# Patient Record
Sex: MALE | Race: OTHER | HISPANIC OR LATINO
[De-identification: names, ages, dates, MRNs, and addresses within clinical notes are randomized per-mention and may not be internally consistent; named-entity substitution may affect disease eponyms.]

---

## 2019-03-29 ENCOUNTER — INBOUND DOCUMENT (OUTPATIENT)
Age: 44
End: 2019-03-29

## 2019-03-29 PROBLEM — Z00.00 ENCOUNTER FOR PREVENTIVE HEALTH EXAMINATION: Status: ACTIVE | Noted: 2019-03-29

## 2019-04-04 ENCOUNTER — APPOINTMENT (OUTPATIENT)
Dept: PULMONOLOGY | Facility: CLINIC | Age: 44
End: 2019-04-04
Payer: COMMERCIAL

## 2019-04-04 VITALS
WEIGHT: 185 LBS | DIASTOLIC BLOOD PRESSURE: 80 MMHG | HEART RATE: 90 BPM | BODY MASS INDEX: 30.82 KG/M2 | OXYGEN SATURATION: 95 % | SYSTOLIC BLOOD PRESSURE: 140 MMHG | HEIGHT: 65 IN

## 2019-04-04 DIAGNOSIS — Z87.891 PERSONAL HISTORY OF NICOTINE DEPENDENCE: ICD-10-CM

## 2019-04-04 DIAGNOSIS — D23.9 OTHER BENIGN NEOPLASM OF SKIN, UNSPECIFIED: ICD-10-CM

## 2019-04-04 PROCEDURE — 99495 TRANSJ CARE MGMT MOD F2F 14D: CPT

## 2019-04-04 PROCEDURE — 99214 OFFICE O/P EST MOD 30 MIN: CPT

## 2019-04-04 NOTE — PHYSICAL EXAM
[General Appearance - Well Developed] : well developed [General Appearance - Well Nourished] : well nourished [Normal Conjunctiva] : the conjunctiva exhibited no abnormalities [Elongated Uvula] : elongated uvula [Neck Appearance] : the appearance of the neck was normal [Jugular Venous Distention Increased] : there was no jugular-venous distention [Thyroid Diffuse Enlargement] : the thyroid was not enlarged [Heart Sounds] : normal S1 and S2 [Arterial Pulses Normal] : the arterial pulses were normal [Edema] : no peripheral edema present [Respiration, Rhythm And Depth] : normal respiratory rhythm and effort [Auscultation Breath Sounds / Voice Sounds] : lungs were clear to auscultation bilaterally [Lungs Percussion] : the lungs were normal to percussion [Bowel Sounds] : normal bowel sounds [Abdomen Soft] : soft [Abdomen Tenderness] : non-tender [Abnormal Walk] : normal gait [Skin Turgor] : normal skin turgor [] : no rash [No Focal Deficits] : no focal deficits [Oriented To Time, Place, And Person] : oriented to person, place, and time [Impaired Insight] : insight and judgment were intact [Affect] : the affect was normal [Memory Recent] : recent memory was not impaired

## 2019-04-04 NOTE — CONSULT LETTER
[Dear  ___] : Dear  [unfilled], [Consult Letter:] : I had the pleasure of evaluating your patient, [unfilled]. [Please see my note below.] : Please see my note below. [Consult Closing:] : Thank you very much for allowing me to participate in the care of this patient.  If you have any questions, please do not hesitate to contact me. [Sincerely,] : Sincerely, [DrSelvin  ___] : Dr. COURTNEY

## 2019-05-02 NOTE — HISTORY OF PRESENT ILLNESS
[FreeTextEntry1] : 42 yo male with left hilar mass occluding pulmonary artery cut off and left effusion.\par Mass not noted but effusion present in 2008\par No Dx on Bronch and ebus.  Pleural Bx and VATS unrevealing\par TB and CVD (KAYLIN, ANCA) LUIS neg. 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

## 2019-05-02 NOTE — ASSESSMENT
[FreeTextEntry1] : Possible congenital mass\par Lymphoma not fully excluded \par Infection seems unlikely

## 2019-05-03 ENCOUNTER — APPOINTMENT (OUTPATIENT)
Dept: PULMONOLOGY | Facility: CLINIC | Age: 44
End: 2019-05-03
Payer: COMMERCIAL

## 2019-05-03 VITALS
WEIGHT: 192 LBS | SYSTOLIC BLOOD PRESSURE: 122 MMHG | DIASTOLIC BLOOD PRESSURE: 80 MMHG | BODY MASS INDEX: 31.95 KG/M2 | OXYGEN SATURATION: 99 % | HEART RATE: 87 BPM

## 2019-05-03 PROCEDURE — 99214 OFFICE O/P EST MOD 30 MIN: CPT

## 2019-05-03 NOTE — PHYSICAL EXAM
[General Appearance - Well Developed] : well developed [General Appearance - Well Nourished] : well nourished [Normal Conjunctiva] : the conjunctiva exhibited no abnormalities [Elongated Uvula] : elongated uvula [Neck Appearance] : the appearance of the neck was normal [Jugular Venous Distention Increased] : there was no jugular-venous distention [Thyroid Diffuse Enlargement] : the thyroid was not enlarged [Heart Sounds] : normal S1 and S2 [Arterial Pulses Normal] : the arterial pulses were normal [Edema] : no peripheral edema present [Respiration, Rhythm And Depth] : normal respiratory rhythm and effort [Lungs Percussion] : the lungs were normal to percussion [Auscultation Breath Sounds / Voice Sounds] : lungs were clear to auscultation bilaterally [Bowel Sounds] : normal bowel sounds [Abdomen Soft] : soft [Abdomen Tenderness] : non-tender [Abnormal Walk] : normal gait [No Focal Deficits] : no focal deficits [Oriented To Time, Place, And Person] : oriented to person, place, and time [Impaired Insight] : insight and judgment were intact [Memory Recent] : recent memory was not impaired [Affect] : the affect was normal [Skin Turgor] : normal skin turgor [] : no rash

## 2019-05-07 NOTE — HISTORY OF PRESENT ILLNESS
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  Pleural Bx and VATS unrevealing\par TB and CVD (KAYLIN, ANCA) LUIS neg. 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 radiology\par They believe it represent chronic thromboembolic disease with evidence of pulmonary hypertension

## 2019-05-31 ENCOUNTER — APPOINTMENT (OUTPATIENT)
Dept: PULMONOLOGY | Facility: CLINIC | Age: 44
End: 2019-05-31
Payer: COMMERCIAL

## 2019-05-31 VITALS — WEIGHT: 200 LBS | BODY MASS INDEX: 33.28 KG/M2

## 2019-05-31 VITALS
HEIGHT: 65 IN | DIASTOLIC BLOOD PRESSURE: 80 MMHG | HEART RATE: 84 BPM | SYSTOLIC BLOOD PRESSURE: 130 MMHG | OXYGEN SATURATION: 96 % | BODY MASS INDEX: 33.28 KG/M2

## 2019-05-31 PROCEDURE — 99214 OFFICE O/P EST MOD 30 MIN: CPT

## 2019-05-31 NOTE — DISCUSSION/SUMMARY
[FreeTextEntry1] : Chronic Thromboembolic Pulmonary Hypertension No active clot Await Cardio and Echo No clear need for Rx Patient informed of signs and symptoms of VTE and told to call if they occur

## 2019-05-31 NOTE — HISTORY OF PRESENT ILLNESS
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  Pleural Bx and VATS unrevealing\par TB and CVD (KAYLIN, ANCA) LUIS neg. 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

## 2019-07-11 ENCOUNTER — APPOINTMENT (OUTPATIENT)
Dept: CARDIOLOGY | Facility: CLINIC | Age: 44
End: 2019-07-11
Payer: COMMERCIAL

## 2019-07-11 ENCOUNTER — NON-APPOINTMENT (OUTPATIENT)
Age: 44
End: 2019-07-11

## 2019-07-11 VITALS
HEART RATE: 75 BPM | OXYGEN SATURATION: 97 % | WEIGHT: 201 LBS | SYSTOLIC BLOOD PRESSURE: 136 MMHG | HEIGHT: 65 IN | DIASTOLIC BLOOD PRESSURE: 73 MMHG | BODY MASS INDEX: 33.49 KG/M2

## 2019-07-11 VITALS — SYSTOLIC BLOOD PRESSURE: 129 MMHG | DIASTOLIC BLOOD PRESSURE: 77 MMHG

## 2019-07-11 DIAGNOSIS — Z78.9 OTHER SPECIFIED HEALTH STATUS: ICD-10-CM

## 2019-07-11 DIAGNOSIS — Z71.9 COUNSELING, UNSPECIFIED: ICD-10-CM

## 2019-07-11 DIAGNOSIS — J18.9 PNEUMONIA, UNSPECIFIED ORGANISM: ICD-10-CM

## 2019-07-11 PROCEDURE — 99204 OFFICE O/P NEW MOD 45 MIN: CPT | Mod: 25

## 2019-07-11 PROCEDURE — 93000 ELECTROCARDIOGRAM COMPLETE: CPT

## 2019-07-22 ENCOUNTER — APPOINTMENT (OUTPATIENT)
Dept: CARDIOLOGY | Facility: CLINIC | Age: 44
End: 2019-07-22

## 2019-08-05 ENCOUNTER — APPOINTMENT (OUTPATIENT)
Dept: CARDIOLOGY | Facility: CLINIC | Age: 44
End: 2019-08-05
Payer: COMMERCIAL

## 2019-08-05 VITALS
BODY MASS INDEX: 33.15 KG/M2 | DIASTOLIC BLOOD PRESSURE: 76 MMHG | HEART RATE: 76 BPM | SYSTOLIC BLOOD PRESSURE: 138 MMHG | HEIGHT: 65 IN | WEIGHT: 199 LBS | OXYGEN SATURATION: 95 %

## 2019-08-05 PROCEDURE — 93306 TTE W/DOPPLER COMPLETE: CPT

## 2019-08-05 PROCEDURE — 99214 OFFICE O/P EST MOD 30 MIN: CPT

## 2019-08-05 NOTE — HISTORY OF PRESENT ILLNESS
[FreeTextEntry1] : 42 yo male sent by Dr Guillaume Zaman with pulmonary hypertension. \par He is from [place] and denies any cardiac history including rheumatic fever. \par Pt with episode of PNA in 2008, with pleural effusion but no hilar mass. \par In March 2019 went to [facility], EBUS and VTAS Bx was reportedly negative for malignancy\par Repeated CT in April 2019 left hilar mass with narrowing of left upper lobe bronchus abd pulmonary htn, enlarged pulmonary artery. \par Pt feels well, no cp but has chronic cough and marsh. \par no palpitations, syncope or presyncope\par Denies any hx of DVT or PE. No family Hx for hypercoagulability.

## 2019-08-05 NOTE — PHYSICAL EXAM
[Normal Appearance] : normal appearance [Well Groomed] : well groomed [General Appearance - In No Acute Distress] : no acute distress [Normal Conjunctiva] : the conjunctiva exhibited no abnormalities [Normal Oral Mucosa] : normal oral mucosa [No Oral Pallor] : no oral pallor [Normal Oropharynx] : normal oropharynx [Normal Jugular Venous A Waves Present] : normal jugular venous A waves present [Normal Jugular Venous V Waves Present] : normal jugular venous V waves present [Heart Rate And Rhythm] : heart rate and rhythm were normal [Heart Sounds] : normal S1 and S2 [Arterial Pulses Normal] : the arterial pulses were normal [Respiration, Rhythm And Depth] : normal respiratory rhythm and effort [Auscultation Breath Sounds / Voice Sounds] : lungs were clear to auscultation bilaterally [Bowel Sounds] : normal bowel sounds [Abdomen Tenderness] : non-tender [Abdomen Soft] : soft [Abnormal Walk] : normal gait [Nail Clubbing] : no clubbing of the fingernails [Petechial Hemorrhages (___cm)] : no petechial hemorrhages [Skin Color & Pigmentation] : normal skin color and pigmentation [Skin Turgor] : normal skin turgor [Oriented To Time, Place, And Person] : oriented to person, place, and time [Impaired Insight] : insight and judgment were intact [Affect] : the affect was normal [FreeTextEntry1] : fixed split of second HS, 3/6 sm right and left sternal border,

## 2019-08-05 NOTE — REVIEW OF SYSTEMS
[Feeling Fatigued] : feeling fatigued [Sore Throat] : sore throat [Dyspnea on exertion] : dyspnea during exertion [Cough] : cough [Recent Weight Gain (___ Lbs)] : no recent weight gain [Blurry Vision] : no blurred vision [Recent Weight Loss (___ Lbs)] : no recent weight loss [Seeing Double (Diplopia)] : no diplopia [Eyeglasses] : not currently wearing eyeglasses [Mouth Sores] : no mouth sores [Earache] : no earache [Chest Pain] : no chest pain [Palpitations] : no palpitations [Wheezing] : no wheezing [Abdominal Pain] : no abdominal pain [Nausea] : no nausea [Change in Appetite] : no change in appetite [Heartburn] : no heartburn [Urinary Frequency] : no change in urinary frequency [Hematuria] : no hematuria [Impotence] : no impotence [Joint Pain] : no joint pain [Skin: A Rash] : no rash: [Muscle Cramps] : no muscle cramps [Skin Lesions] : no skin lesions [Dizziness] : no dizziness [Convulsions] : no convulsions [Confusion] : no confusion was observed [Anxiety] : no anxiety [Excessive Thirst] : no polydipsia [Easy Bruising] : no tendency for easy bruising [Easy Bleeding] : no tendency for easy bleeding

## 2019-08-05 NOTE — ASSESSMENT
[FreeTextEntry1] : Mr. Hodge is here with diagnosis of pulmonary hypertension. It is unclear if he has CTEPH or not. His VQ scan and CT scan have been negative. It is possible the he has pulmonary hypertension based on compression of PA from left sided mass. \par I asked him to repeat CT scan to exclude PE. He will do this at a  facility so we can review.\par TTE is also ordered. He will see me once the above are completed.

## 2019-08-06 ENCOUNTER — FORM ENCOUNTER (OUTPATIENT)
Age: 44
End: 2019-08-06

## 2019-08-07 ENCOUNTER — APPOINTMENT (OUTPATIENT)
Dept: CT IMAGING | Facility: CLINIC | Age: 44
End: 2019-08-07
Payer: COMMERCIAL

## 2019-08-07 ENCOUNTER — OUTPATIENT (OUTPATIENT)
Dept: OUTPATIENT SERVICES | Facility: HOSPITAL | Age: 44
LOS: 1 days | End: 2019-08-07
Payer: COMMERCIAL

## 2019-08-07 DIAGNOSIS — I27.24 CHRONIC THROMBOEMBOLIC PULMONARY HYPERTENSION: ICD-10-CM

## 2019-08-07 PROCEDURE — 82565 ASSAY OF CREATININE: CPT

## 2019-08-07 PROCEDURE — 71275 CT ANGIOGRAPHY CHEST: CPT | Mod: 26

## 2019-08-07 PROCEDURE — 71275 CT ANGIOGRAPHY CHEST: CPT

## 2019-08-19 ENCOUNTER — APPOINTMENT (OUTPATIENT)
Dept: CARDIOLOGY | Facility: CLINIC | Age: 44
End: 2019-08-19
Payer: COMMERCIAL

## 2019-08-19 ENCOUNTER — APPOINTMENT (OUTPATIENT)
Dept: THORACIC SURGERY | Facility: CLINIC | Age: 44
End: 2019-08-19
Payer: COMMERCIAL

## 2019-08-19 VITALS
OXYGEN SATURATION: 96 % | DIASTOLIC BLOOD PRESSURE: 89 MMHG | HEART RATE: 87 BPM | SYSTOLIC BLOOD PRESSURE: 145 MMHG | BODY MASS INDEX: 33.15 KG/M2 | RESPIRATION RATE: 16 BRPM | WEIGHT: 199 LBS | HEIGHT: 65 IN

## 2019-08-19 VITALS
HEART RATE: 92 BPM | BODY MASS INDEX: 34.16 KG/M2 | OXYGEN SATURATION: 97 % | SYSTOLIC BLOOD PRESSURE: 127 MMHG | DIASTOLIC BLOOD PRESSURE: 75 MMHG | HEIGHT: 65 IN | WEIGHT: 205 LBS

## 2019-08-19 DIAGNOSIS — I27.29 OTHER SECONDARY PULMONARY HYPERTENSION: ICD-10-CM

## 2019-08-19 PROCEDURE — 99214 OFFICE O/P EST MOD 30 MIN: CPT

## 2019-08-19 PROCEDURE — 99204 OFFICE O/P NEW MOD 45 MIN: CPT

## 2019-08-19 NOTE — PHYSICAL EXAM
[General Appearance - Alert] : alert [Neck Cervical Mass (___cm)] : no neck mass was observed [Neck Appearance] : the appearance of the neck was normal [General Appearance - In No Acute Distress] : in no acute distress [Jugular Venous Distention Increased] : there was no jugular-venous distention [Thyroid Diffuse Enlargement] : the thyroid was not enlarged [Thyroid Nodule] : there were no palpable thyroid nodules [Auscultation Breath Sounds / Voice Sounds] : lungs were clear to auscultation bilaterally [Heart Sounds] : normal S1 and S2 [Heart Rate And Rhythm] : heart rate was normal and rhythm regular [Murmurs] : no murmurs [Heart Sounds Gallop] : no gallops [Examination Of The Chest] : the chest was normal in appearance [Heart Sounds Pericardial Friction Rub] : no pericardial rub [Diminished Respiratory Excursion] : normal chest expansion [Chest Visual Inspection Thoracic Asymmetry] : no chest asymmetry [Abdomen Soft] : soft [Abdomen Tenderness] : non-tender [Bowel Sounds] : normal bowel sounds [Abdomen Mass (___ Cm)] : no abdominal mass palpated [] : no hepato-splenomegaly [Cervical Lymph Nodes Enlarged Anterior Bilaterally] : anterior cervical [Abnormal Walk] : normal gait [Cervical Lymph Nodes Enlarged Posterior Bilaterally] : posterior cervical [No Focal Deficits] : no focal deficits [Impaired Insight] : insight and judgment were intact [Oriented To Time, Place, And Person] : oriented to person, place, and time [Affect] : the affect was normal

## 2019-08-19 NOTE — HISTORY OF PRESENT ILLNESS
[FreeTextEntry1] : \larry Hodge was in the office today for an initial visit. He has a history of a mediastinal abnormality that has caused occlusion of his left pulmonary artery and what appears to be fibrotic changes in the mediastinum. 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 He did have an endobronchial ultrasound in addition to a thoracotomy at an outside institution with biopsies that did not demonstrate malignancy or a specific tissue diagnosis. He feels well at this point and has no complaints. He denies fever, chills, night sweats, weight loss or weight gain.

## 2019-08-19 NOTE — ASSESSMENT
[FreeTextEntry1] : Naveen is a 44-year-old male with what appears to be scarring in the mediastinum without evidence of malignancy seen on previous biopsies. 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 He is asymptomatic at this point and is scheduled to undergo a pulmonary followup in the upcoming weeks. Steroids may be appropriate but I will defer to pulmonary for further recommendations. At this point there is no surgical option to treat this process and it does not appear malignancy is involved.\par \par Thank you for allowing me to participate in the care of your patient.\par \par Joel Raymundo MD\par Department of Cardiovascular and Thoracic Surgery\par \par [employer]\[facility] [employer] [facility]\par

## 2019-08-22 NOTE — HISTORY OF PRESENT ILLNESS
[FreeTextEntry1] : 42 yo male sent by Dr Guillaume Zaman with pulmonary hypertension. \par He is from [place] and denies any cardiac history including rheumatic fever. \par Pt with episode of PNA in 2008, with pleural effusion but no hilar mass. \par In March 2019 went to [facility], EBUS and VTAS Bx was reportedly negative for malignancy\par Repeated CT in April 2019 left hilar mass with narrowing of left upper lobe bronchus abd pulmonary htn, enlarged pulmonary artery. \par Pt feels well, no cp but has chronic cough and burnett. \par no palpitations, syncope or presyncope\par Denies any hx of DVT or PE. No family Hx for hypercoagulability. \par \par 8/5/2019\par Patient reports for follow up visit. Patient did not schedule to do CT scan of the chest which was ordered  to r/o PE. She states feeling well,  c/o occasional BURNETT,  denies any chest pain, palpitation, SOB, orthopnea, PND, abdominal pain, nausea, vomiting, melena, hematuria and syncope or near syncope. He is being physically active at all the time, he is a . Echo cardiogram is done today- EF- 55%, normal global LVF, no evidence of PH.\par \par 8/19/2019\par Patient reports to discuss test results if  CT scan- there is no PE is noted on CT angio chest. He states feeling well, denies any chest pain, palpitation, SOB, orthopnea, PND, abdominal pain, nausea, vomiting, melena, hematuria and syncope or near syncope.

## 2019-08-22 NOTE — PHYSICAL EXAM
[Normal Appearance] : normal appearance [Well Groomed] : well groomed [General Appearance - In No Acute Distress] : no acute distress [Normal Conjunctiva] : the conjunctiva exhibited no abnormalities [No Oral Pallor] : no oral pallor [Normal Oral Mucosa] : normal oral mucosa [Normal Oropharynx] : normal oropharynx [Normal Jugular Venous A Waves Present] : normal jugular venous A waves present [Respiration, Rhythm And Depth] : normal respiratory rhythm and effort [Normal Jugular Venous V Waves Present] : normal jugular venous V waves present [Heart Rate And Rhythm] : heart rate and rhythm were normal [Auscultation Breath Sounds / Voice Sounds] : lungs were clear to auscultation bilaterally [Heart Sounds] : normal S1 and S2 [Arterial Pulses Normal] : the arterial pulses were normal [Bowel Sounds] : normal bowel sounds [Abdomen Tenderness] : non-tender [Abdomen Soft] : soft [Abnormal Walk] : normal gait [Nail Clubbing] : no clubbing of the fingernails [Petechial Hemorrhages (___cm)] : no petechial hemorrhages [Skin Turgor] : normal skin turgor [Oriented To Time, Place, And Person] : oriented to person, place, and time [Skin Color & Pigmentation] : normal skin color and pigmentation [Affect] : the affect was normal [Impaired Insight] : insight and judgment were intact [FreeTextEntry1] : fixed split of second HS, 3/6 sm right and left sternal border,

## 2019-08-22 NOTE — DISCUSSION/SUMMARY
[FreeTextEntry1] : Patient reports for follow up  visit to discuss result of CT scan. No PE noted on CT angio of the chest.  Echo cardiogram shows- no evidence of PH, normal LVF. Denies any CP,SOB, palpitations. \par \par Plan.\par 1. SOB- no PE on CT scan, now pt denies any SOB\par 2. echocardiogram- EF- 56%, no evidence of PH, normal LVF, no valvular diseases. \par 3. f/u with pulmonologist.  \par 4. f/u in 6 months if there is no new symptoms. \par 5. Ammy/ staff explained to him in [place] (pt refused to use  phone.) , answered all his questions, verbalized understanding. \par \par Arpita Lobo, NPC

## 2019-08-22 NOTE — REVIEW OF SYSTEMS
[Negative] : Heme/Lymph [Recent Weight Gain (___ Lbs)] : no recent weight gain [Feeling Fatigued] : not feeling fatigued [Recent Weight Loss (___ Lbs)] : no recent weight loss [Blurry Vision] : no blurred vision [Eyeglasses] : not currently wearing eyeglasses [Seeing Double (Diplopia)] : no diplopia [Mouth Sores] : no mouth sores [Earache] : no earache [Sore Throat] : no sore throat [Chest Pain] : no chest pain [Dyspnea on exertion] : not dyspnea during exertion [Cough] : no cough [Palpitations] : no palpitations [Abdominal Pain] : no abdominal pain [Wheezing] : no wheezing [Nausea] : no nausea [Heartburn] : no heartburn [Change in Appetite] : no change in appetite [Urinary Frequency] : no change in urinary frequency [Hematuria] : no hematuria [Impotence] : no impotence [Skin: A Rash] : no rash: [Muscle Cramps] : no muscle cramps [Joint Pain] : no joint pain [Skin Lesions] : no skin lesions [Dizziness] : no dizziness [Convulsions] : no convulsions [Confusion] : no confusion was observed [Anxiety] : no anxiety [Excessive Thirst] : no polydipsia [Easy Bleeding] : no tendency for easy bleeding [Easy Bruising] : no tendency for easy bruising

## 2019-08-23 NOTE — DISCUSSION/SUMMARY
[FreeTextEntry1] : Patient reports for follow up  visit. Patient did not schedule to do CT scan of the chest which was ordered last visit  to r/o PE. She states feeling well,  c/o occasional BURNETT,  denies any chest pain, palpitation, SOB at rest , orthopnea, PND, abdominal pain, nausea, vomiting, melena, hematuria and syncope or near syncope. He is being physically active at all the time, he is a . Echo cardiogram is done today- no evidence of PH, normal LVF. Seen and examined by Dr. [place]. \par \par Plan.\par 1. Advised pt to schedule at [facility] for repeat CT scan to exclude PE. \par 2. echocardiogram- EF- 56%, no evidence of PH, normal LVF, no valvular diseases. \par 3. f/u next week after CT scan testing. \par  Ammy/ staff explained to him in [place] (pt refused to use  phone.) , answered all his questions, verbalized understanding. \par \par Arpita Lobo, NPC

## 2019-08-23 NOTE — REVIEW OF SYSTEMS
[Dyspnea on exertion] : dyspnea during exertion [Negative] : Heme/Lymph [Recent Weight Gain (___ Lbs)] : no recent weight gain [Feeling Fatigued] : not feeling fatigued [Recent Weight Loss (___ Lbs)] : no recent weight loss [Blurry Vision] : no blurred vision [Seeing Double (Diplopia)] : no diplopia [Eyeglasses] : not currently wearing eyeglasses [Mouth Sores] : no mouth sores [Earache] : no earache [Sore Throat] : no sore throat [Chest Pain] : no chest pain [Palpitations] : no palpitations [Cough] : no cough [Wheezing] : no wheezing [Abdominal Pain] : no abdominal pain [Nausea] : no nausea [Heartburn] : no heartburn [Change in Appetite] : no change in appetite [Urinary Frequency] : no change in urinary frequency [Impotence] : no impotence [Hematuria] : no hematuria [Muscle Cramps] : no muscle cramps [Joint Pain] : no joint pain [Skin: A Rash] : no rash: [Skin Lesions] : no skin lesions [Dizziness] : no dizziness [Convulsions] : no convulsions [Confusion] : no confusion was observed [Excessive Thirst] : no polydipsia [Anxiety] : no anxiety [Easy Bleeding] : no tendency for easy bleeding [Easy Bruising] : no tendency for easy bruising

## 2019-08-23 NOTE — REASON FOR VISIT
[Follow-Up - Clinic] : a clinic follow-up of [Source: ______] : History obtained from [unfilled] [FreeTextEntry2] : Pulmonary htn

## 2019-08-23 NOTE — ADDENDUM
[FreeTextEntry1] : pt seen and examined. \par plan of care d/w np in length.\par CT scan ordered, fu in 1 week\par

## 2019-08-23 NOTE — HISTORY OF PRESENT ILLNESS
[FreeTextEntry1] : 42 yo male sent by Dr Guillaume Zaman with pulmonary hypertension. \par He is from [place] and denies any cardiac history including rheumatic fever. \par Pt with episode of PNA in 2008, with pleural effusion but no hilar mass. \par In March 2019 went to [facility], EBUS and VTAS Bx was reportedly negative for malignancy\par Repeated CT in April 2019 left hilar mass with narrowing of left upper lobe bronchus abd pulmonary htn, enlarged pulmonary artery. \par Pt feels well, no cp but has chronic cough and burnett. \par no palpitations, syncope or presyncope\par Denies any hx of DVT or PE. No family Hx for hypercoagulability. \par \par 8/5/2019\par Patient reports for follow up visit. Patient did not schedule to do CT scan of the chest which was ordered  to r/o PE. She states feeling well,  c/o occasional BURNETT,  denies any chest pain, palpitation, SOB, orthopnea, PND, abdominal pain, nausea, vomiting, melena, hematuria and syncope or near syncope. He is being physically active at all the time, he is a . Echo cardiogram is done today- EF- 55%, normal global LVF, no evidence of PH.\par

## 2019-08-23 NOTE — PHYSICAL EXAM
[Normal Appearance] : normal appearance [Well Groomed] : well groomed [Normal Conjunctiva] : the conjunctiva exhibited no abnormalities [General Appearance - In No Acute Distress] : no acute distress [Normal Oral Mucosa] : normal oral mucosa [No Oral Pallor] : no oral pallor [Normal Jugular Venous A Waves Present] : normal jugular venous A waves present [Normal Oropharynx] : normal oropharynx [Respiration, Rhythm And Depth] : normal respiratory rhythm and effort [Normal Jugular Venous V Waves Present] : normal jugular venous V waves present [Auscultation Breath Sounds / Voice Sounds] : lungs were clear to auscultation bilaterally [Heart Rate And Rhythm] : heart rate and rhythm were normal [Heart Sounds] : normal S1 and S2 [Arterial Pulses Normal] : the arterial pulses were normal [Abdomen Tenderness] : non-tender [Bowel Sounds] : normal bowel sounds [Abdomen Soft] : soft [Petechial Hemorrhages (___cm)] : no petechial hemorrhages [Abnormal Walk] : normal gait [Nail Clubbing] : no clubbing of the fingernails [Skin Turgor] : normal skin turgor [Skin Color & Pigmentation] : normal skin color and pigmentation [Oriented To Time, Place, And Person] : oriented to person, place, and time [Affect] : the affect was normal [Impaired Insight] : insight and judgment were intact [FreeTextEntry1] : fixed split of second HS, 3/6 sm right and left sternal border,

## 2019-08-30 ENCOUNTER — APPOINTMENT (OUTPATIENT)
Dept: PULMONOLOGY | Facility: CLINIC | Age: 44
End: 2019-08-30
Payer: COMMERCIAL

## 2019-08-30 VITALS
OXYGEN SATURATION: 95 % | SYSTOLIC BLOOD PRESSURE: 136 MMHG | RESPIRATION RATE: 16 BRPM | HEART RATE: 86 BPM | WEIGHT: 204 LBS | DIASTOLIC BLOOD PRESSURE: 80 MMHG | BODY MASS INDEX: 33.95 KG/M2

## 2019-08-30 DIAGNOSIS — I27.24 CHRONIC THROMBOEMBOLIC PULMONARY HYPERTENSION: ICD-10-CM

## 2019-08-30 PROCEDURE — 99214 OFFICE O/P EST MOD 30 MIN: CPT

## 2019-08-30 RX ORDER — MONTELUKAST SODIUM 10 MG/1
10 TABLET, FILM COATED ORAL
Refills: 0 | Status: DISCONTINUED | COMMUNITY
End: 2019-08-30

## 2019-08-30 RX ORDER — FLUTICASONE PROPIONATE 50 MCG
50 SPRAY, SUSPENSION NASAL
Refills: 0 | Status: DISCONTINUED | COMMUNITY
End: 2019-08-30

## 2019-08-30 NOTE — HISTORY OF PRESENT ILLNESS
[FreeTextEntry1] : 42 yo male with left hilar mass occluding pulmonary artery cut off and left effusion.\par Mass not noted but effusion present in 2008\par No Dx on Bronch and ebus.  Pleural Bx and VATS unrevealing\par TB and CVD (KAYLIN, ANCA) LUIS neg. 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\par Left hilar may be old and congenital \par Never a smoker\par BCG in [place].\par Dust allergy\par Hospitalized at [facility] from 3/20-3/27/19\par Sent home on Augmentin, Cough syrrip and DuoNeb\par Still coughing\par No hemoptysis\par Night sweats\par Has FU appt with ID - Dr Munson\par So far no appt with Dr Swan ([facility]) \par \par 5/3/19\par Review CT with [facility] radiology\par They believe it represent chronic thromboembolic disease with evidence of pulmonary hypertension\par \par 5/31/19\par NAD\par VQ, Leg Duplex neg\par \par 8/30/19\par OK\par Echo WNL\par

## 2019-08-30 NOTE — PHYSICAL EXAM
[General Appearance - Well Developed] : well developed [General Appearance - Well Nourished] : well nourished [Elongated Uvula] : elongated uvula [Normal Conjunctiva] : the conjunctiva exhibited no abnormalities [Neck Appearance] : the appearance of the neck was normal [Jugular Venous Distention Increased] : there was no jugular-venous distention [Thyroid Diffuse Enlargement] : the thyroid was not enlarged [Heart Sounds] : normal S1 and S2 [Arterial Pulses Normal] : the arterial pulses were normal [Edema] : no peripheral edema present [Respiration, Rhythm And Depth] : normal respiratory rhythm and effort [Auscultation Breath Sounds / Voice Sounds] : lungs were clear to auscultation bilaterally [Bowel Sounds] : normal bowel sounds [Lungs Percussion] : the lungs were normal to percussion [Abdomen Soft] : soft [Abdomen Tenderness] : non-tender [Abnormal Walk] : normal gait [No Focal Deficits] : no focal deficits [Oriented To Time, Place, And Person] : oriented to person, place, and time [Impaired Insight] : insight and judgment were intact [Affect] : the affect was normal [Memory Recent] : recent memory was not impaired [Skin Turgor] : normal skin turgor [] : no rash

## 2019-08-30 NOTE — DISCUSSION/SUMMARY
[FreeTextEntry1] : Chronic Thromboembolic Pulmonary Hypertension vs fibrosing mediastinitis\par  No active clot \par Cardio evaluation and Echo -  No clear need for Rx\par  Patient informed of signs and symptoms of VTE and told to call if they occur

## 2019-10-18 ENCOUNTER — OTHER (OUTPATIENT)
Age: 44
End: 2019-10-18

## 2020-03-19 ENCOUNTER — APPOINTMENT (OUTPATIENT)
Dept: CARDIOLOGY | Facility: CLINIC | Age: 45
End: 2020-03-19

## 2020-04-20 ENCOUNTER — NON-APPOINTMENT (OUTPATIENT)
Age: 45
End: 2020-04-20

## 2020-06-24 NOTE — REASON FOR VISIT
[Follow-Up - Clinic] : a clinic follow-up of [Source: ______] : History obtained from [unfilled] [FreeTextEntry2] : Pulmonary htn right

## 2023-02-28 ENCOUNTER — INPATIENT (INPATIENT)
Facility: HOSPITAL | Age: 48
LOS: 1 days | Discharge: HOME | DRG: 287 | End: 2023-03-02
Attending: INTERNAL MEDICINE | Admitting: STUDENT IN AN ORGANIZED HEALTH CARE EDUCATION/TRAINING PROGRAM
Payer: COMMERCIAL

## 2023-02-28 VITALS
SYSTOLIC BLOOD PRESSURE: 181 MMHG | HEIGHT: 65 IN | HEART RATE: 84 BPM | WEIGHT: 207.01 LBS | OXYGEN SATURATION: 93 % | DIASTOLIC BLOOD PRESSURE: 92 MMHG | TEMPERATURE: 98 F | RESPIRATION RATE: 20 BRPM

## 2023-02-28 LAB
ALBUMIN SERPL ELPH-MCNC: 3.9 G/DL — SIGNIFICANT CHANGE UP (ref 3.3–5.2)
ALP SERPL-CCNC: 88 U/L — SIGNIFICANT CHANGE UP (ref 40–120)
ALT FLD-CCNC: 188 U/L — HIGH
ANION GAP SERPL CALC-SCNC: 12 MMOL/L — SIGNIFICANT CHANGE UP (ref 5–17)
AST SERPL-CCNC: 90 U/L — HIGH
BASOPHILS # BLD AUTO: 0.03 K/UL — SIGNIFICANT CHANGE UP (ref 0–0.2)
BASOPHILS NFR BLD AUTO: 0.3 % — SIGNIFICANT CHANGE UP (ref 0–2)
BILIRUB SERPL-MCNC: 0.5 MG/DL — SIGNIFICANT CHANGE UP (ref 0.4–2)
BUN SERPL-MCNC: 15.6 MG/DL — SIGNIFICANT CHANGE UP (ref 8–20)
CALCIUM SERPL-MCNC: 8.9 MG/DL — SIGNIFICANT CHANGE UP (ref 8.4–10.5)
CHLORIDE SERPL-SCNC: 102 MMOL/L — SIGNIFICANT CHANGE UP (ref 96–108)
CO2 SERPL-SCNC: 25 MMOL/L — SIGNIFICANT CHANGE UP (ref 22–29)
CREAT SERPL-MCNC: 0.8 MG/DL — SIGNIFICANT CHANGE UP (ref 0.5–1.3)
D DIMER BLD IA.RAPID-MCNC: 246 NG/ML DDU — HIGH
EGFR: 108 ML/MIN/1.73M2 — SIGNIFICANT CHANGE UP
EOSINOPHIL # BLD AUTO: 0.31 K/UL — SIGNIFICANT CHANGE UP (ref 0–0.5)
EOSINOPHIL NFR BLD AUTO: 3.3 % — SIGNIFICANT CHANGE UP (ref 0–6)
GLUCOSE SERPL-MCNC: 75 MG/DL — SIGNIFICANT CHANGE UP (ref 70–99)
HCT VFR BLD CALC: 50.2 % — HIGH (ref 39–50)
HGB BLD-MCNC: 16.7 G/DL — SIGNIFICANT CHANGE UP (ref 13–17)
IMM GRANULOCYTES NFR BLD AUTO: 0.4 % — SIGNIFICANT CHANGE UP (ref 0–0.9)
LYMPHOCYTES # BLD AUTO: 1.83 K/UL — SIGNIFICANT CHANGE UP (ref 1–3.3)
LYMPHOCYTES # BLD AUTO: 19.3 % — SIGNIFICANT CHANGE UP (ref 13–44)
MCHC RBC-ENTMCNC: 29.9 PG — SIGNIFICANT CHANGE UP (ref 27–34)
MCHC RBC-ENTMCNC: 33.3 GM/DL — SIGNIFICANT CHANGE UP (ref 32–36)
MCV RBC AUTO: 89.8 FL — SIGNIFICANT CHANGE UP (ref 80–100)
MONOCYTES # BLD AUTO: 0.69 K/UL — SIGNIFICANT CHANGE UP (ref 0–0.9)
MONOCYTES NFR BLD AUTO: 7.3 % — SIGNIFICANT CHANGE UP (ref 2–14)
NEUTROPHILS # BLD AUTO: 6.59 K/UL — SIGNIFICANT CHANGE UP (ref 1.8–7.4)
NEUTROPHILS NFR BLD AUTO: 69.4 % — SIGNIFICANT CHANGE UP (ref 43–77)
PLATELET # BLD AUTO: 211 K/UL — SIGNIFICANT CHANGE UP (ref 150–400)
POTASSIUM SERPL-MCNC: 3.9 MMOL/L — SIGNIFICANT CHANGE UP (ref 3.5–5.3)
POTASSIUM SERPL-SCNC: 3.9 MMOL/L — SIGNIFICANT CHANGE UP (ref 3.5–5.3)
PROT SERPL-MCNC: 7.5 G/DL — SIGNIFICANT CHANGE UP (ref 6.6–8.7)
RBC # BLD: 5.59 M/UL — SIGNIFICANT CHANGE UP (ref 4.2–5.8)
RBC # FLD: 12.8 % — SIGNIFICANT CHANGE UP (ref 10.3–14.5)
SODIUM SERPL-SCNC: 139 MMOL/L — SIGNIFICANT CHANGE UP (ref 135–145)
TROPONIN T SERPL-MCNC: <0.01 NG/ML — SIGNIFICANT CHANGE UP (ref 0–0.06)
WBC # BLD: 9.49 K/UL — SIGNIFICANT CHANGE UP (ref 3.8–10.5)
WBC # FLD AUTO: 9.49 K/UL — SIGNIFICANT CHANGE UP (ref 3.8–10.5)

## 2023-02-28 PROCEDURE — 71045 X-RAY EXAM CHEST 1 VIEW: CPT | Mod: 26

## 2023-02-28 PROCEDURE — 99223 1ST HOSP IP/OBS HIGH 75: CPT

## 2023-02-28 PROCEDURE — 93010 ELECTROCARDIOGRAM REPORT: CPT

## 2023-02-28 RX ORDER — METOPROLOL TARTRATE 50 MG
100 TABLET ORAL ONCE
Refills: 0 | Status: DISCONTINUED | OUTPATIENT
Start: 2023-03-01 | End: 2023-03-01

## 2023-02-28 RX ORDER — METOPROLOL TARTRATE 50 MG
100 TABLET ORAL ONCE
Refills: 0 | Status: COMPLETED | OUTPATIENT
Start: 2023-02-28 | End: 2023-02-28

## 2023-02-28 NOTE — ED CDU PROVIDER INITIAL DAY NOTE - OBJECTIVE STATEMENT
47 Y.o male states hx of HLD not on med sent from his PMD's office when he was found to have new TWI in anterior leads. Subsequently sent here for further evaluation. Denies any active chest pain or SOB. Does feel fatigued and has felt more tired the past two weeks. 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 denies any smoking or drinking or use drugs . he received asa at his primary care clinic as per pt he denies any hx of CAD in his family mom or dad

## 2023-02-28 NOTE — ED ADULT NURSE NOTE - CHIEF COMPLAINT QUOTE
chest pain and sob; abnormal ekg in PCP office today; had lung biopsy in 2019, to rule out lung cancer but found to have pneumonia, pt states has not been the same since biopsy, has intermittent SOB

## 2023-02-28 NOTE — ED ADULT TRIAGE NOTE - CHIEF COMPLAINT QUOTE
chest pain and sob; had lung biopsy in 2019, to rule out lung cancer but found to have pneumonia, pt states has not been the same since biopsy, has intermittent SOB chest pain and sob; abnormal ekg in PCP office today; had lung biopsy in 2019, to rule out lung cancer but found to have pneumonia, pt states has not been the same since biopsy, has intermittent SOB

## 2023-02-28 NOTE — ED CDU PROVIDER INITIAL DAY NOTE - PHYSICAL EXAMINATION
Const: AOX3 nontoxic appearing, no apparent respiratory or physical distress. on cardiac monitoring moderate obese male   HEENT: NC/AT. Moist mucous membranes.  Eyes: PAT. EOMI  Neck: Soft and supple. Full ROM without pain.  Cardiac: Regular rate and regular rhythm. +S1/S2. No murmurs. Peripheral pulses 2+ and symmetric. No LE edema.  Resp: Speaking in full sentences. No evidence of respiratory distress. No wheezes, rales or rhonchi. No adventitious breath sounds   Abd: Soft, non-tender, non-distended. Normal bowel sounds in all 4 quadrants. right flank area round mobile cyst noted no erythema or edema or TTP   Back: Spine midline and non-tender. No CVAT.  Skin: No visible rashes, abrasions or lacerations.  Neuro: Awake, alert & oriented x 3. Moves all extremities symmetrically.

## 2023-02-28 NOTE — ED PROVIDER NOTE - OBJECTIVE STATEMENT
Patient with routine labs and EKG today at his PMD's office when he was found to have new TWI in anterior leads. Subsequently sent here for further evaluation. Denies any active chest pain or SOB. Does feel fatigued and has felt more tired the past two weeks.

## 2023-02-28 NOTE — ED ADULT NURSE NOTE - OBJECTIVE STATEMENT
a&ox4 sent by MD for abnormal EKG; pt sends copy of ekg showing "new t wave inversion". pt denies pain/sob/fatigue or further symptoms.

## 2023-02-28 NOTE — ED PROVIDER NOTE - LANGUAGE ASSISTANCE NEEDED
pt accepts direct communication with Congolese speaking RN/Yes-Patient/Caregiver accepts free interpretation services...

## 2023-02-28 NOTE — ED CDU PROVIDER INITIAL DAY NOTE - LANGUAGE ASSISTANCE NEEDED
pt accepts direct communication with Tanzanian speaking RN/Yes-Patient/Caregiver accepts free interpretation services...

## 2023-02-28 NOTE — ED CDU PROVIDER INITIAL DAY NOTE - NS ED ATTENDING STATEMENT MOD
This was a shared visit with the DANYA. I reviewed and verified the documentation and independently performed the documented:

## 2023-02-28 NOTE — ED ADULT NURSE NOTE - LANGUAGE ASSISTANCE NEEDED
pt accepts direct communication with Citizen of [place] speaking RN/Yes-Patient/Caregiver accepts free interpretation services...

## 2023-02-28 NOTE — ED PROVIDER NOTE - CLINICAL SUMMARY MEDICAL DECISION MAKING FREE TEXT BOX
47M presenting for evaluation of new TWI in anterior leads on EKG, also c/o fatigue x2 weeks, left sided chest discomfort, will send labs, obtain cxr, cardiology consultation, follow up studies, reassess, dispo.

## 2023-02-28 NOTE — ED CDU PROVIDER INITIAL DAY NOTE - ATTENDING APP SHARED VISIT CONTRIBUTION OF CARE
I, Jerrell Byrd, performed the initial face to face bedside interview with this patient regarding history of present illness, review of symptoms and relevant past medical, social and family history.  I completed an independent physical examination.  I was the initial provider who evaluated this patient. I have signed out the follow up of any pending tests (i.e. labs, radiological studies) to the ACP.  I have communicated the patient’s plan of care and disposition with the ACP.

## 2023-02-28 NOTE — ED ADULT NURSE REASSESSMENT NOTE - NS ED NURSE REASSESS COMMENT FT1
initial ekg completed, given to Dr. Weber, verbal order to repeat ekg to be done in 5 min, repeat ekg completed as ordered.

## 2023-02-28 NOTE — ED ADULT NURSE NOTE - ED STAT RN HANDOFF DETAILS
pt transported to cardiac cath, in no apparent distress, awake and alert orientedx4, cardiac monitoring in progress, per MD Yang pt can go for cath then CT and Echo, pt in possession of all belongings

## 2023-02-28 NOTE — ED ADULT TRIAGE NOTE - LANGUAGE ASSISTANCE NEEDED
pt accepts direct communication with Bhutanese speaking RN/Yes-Patient/Caregiver accepts free interpretation services...

## 2023-03-01 DIAGNOSIS — Z98.890 OTHER SPECIFIED POSTPROCEDURAL STATES: Chronic | ICD-10-CM

## 2023-03-01 DIAGNOSIS — E78.5 HYPERLIPIDEMIA, UNSPECIFIED: ICD-10-CM

## 2023-03-01 DIAGNOSIS — R07.9 CHEST PAIN, UNSPECIFIED: ICD-10-CM

## 2023-03-01 DIAGNOSIS — R94.31 ABNORMAL ELECTROCARDIOGRAM [ECG] [EKG]: ICD-10-CM

## 2023-03-01 LAB
A1C WITH ESTIMATED AVERAGE GLUCOSE RESULT: 5.2 % — SIGNIFICANT CHANGE UP (ref 4–5.6)
APTT BLD: 29.5 SEC — SIGNIFICANT CHANGE UP (ref 27.5–35.5)
CHOLEST SERPL-MCNC: 172 MG/DL — SIGNIFICANT CHANGE UP
ERYTHROCYTE [SEDIMENTATION RATE] IN BLOOD: 12 MM/HR — SIGNIFICANT CHANGE UP (ref 0–20)
ESTIMATED AVERAGE GLUCOSE: 103 MG/DL — SIGNIFICANT CHANGE UP (ref 68–114)
HDLC SERPL-MCNC: 29 MG/DL — LOW
INR BLD: 1.1 RATIO — SIGNIFICANT CHANGE UP (ref 0.88–1.16)
LIPID PNL WITH DIRECT LDL SERPL: 123 MG/DL — HIGH
MAGNESIUM SERPL-MCNC: 1.9 MG/DL — SIGNIFICANT CHANGE UP (ref 1.6–2.6)
NON HDL CHOLESTEROL: 143 MG/DL — HIGH
NT-PROBNP SERPL-SCNC: 165 PG/ML — SIGNIFICANT CHANGE UP (ref 0–300)
PROTHROM AB SERPL-ACNC: 12.8 SEC — SIGNIFICANT CHANGE UP (ref 10.5–13.4)
RAPID RVP RESULT: SIGNIFICANT CHANGE UP
SARS-COV-2 RNA SPEC QL NAA+PROBE: SIGNIFICANT CHANGE UP
T3 SERPL-MCNC: 130 NG/DL — SIGNIFICANT CHANGE UP (ref 80–200)
T4 AB SER-ACNC: 9.5 UG/DL — SIGNIFICANT CHANGE UP (ref 4.5–12)
TRIGL SERPL-MCNC: 98 MG/DL — SIGNIFICANT CHANGE UP
TROPONIN T SERPL-MCNC: <0.01 NG/ML — SIGNIFICANT CHANGE UP (ref 0–0.06)
TSH SERPL-MCNC: 0.78 UIU/ML — SIGNIFICANT CHANGE UP (ref 0.27–4.2)

## 2023-03-01 PROCEDURE — 71250 CT THORAX DX C-: CPT | Mod: 26

## 2023-03-01 PROCEDURE — 99223 1ST HOSP IP/OBS HIGH 75: CPT

## 2023-03-01 PROCEDURE — 99152 MOD SED SAME PHYS/QHP 5/>YRS: CPT

## 2023-03-01 PROCEDURE — 93010 ELECTROCARDIOGRAM REPORT: CPT

## 2023-03-01 PROCEDURE — 99233 SBSQ HOSP IP/OBS HIGH 50: CPT

## 2023-03-01 PROCEDURE — 99222 1ST HOSP IP/OBS MODERATE 55: CPT

## 2023-03-01 PROCEDURE — 93458 L HRT ARTERY/VENTRICLE ANGIO: CPT | Mod: 26

## 2023-03-01 RX ORDER — RANOLAZINE 500 MG/1
1 TABLET, FILM COATED, EXTENDED RELEASE ORAL
Qty: 0 | Refills: 0 | DISCHARGE

## 2023-03-01 RX ORDER — CARVEDILOL PHOSPHATE 80 MG/1
1 CAPSULE, EXTENDED RELEASE ORAL
Qty: 0 | Refills: 0 | DISCHARGE

## 2023-03-01 RX ORDER — SPIRONOLACTONE 25 MG/1
0 TABLET, FILM COATED ORAL
Qty: 0 | Refills: 0 | DISCHARGE

## 2023-03-01 RX ORDER — INSULIN GLARGINE 100 [IU]/ML
0 INJECTION, SOLUTION SUBCUTANEOUS
Qty: 0 | Refills: 0 | DISCHARGE

## 2023-03-01 RX ORDER — ATORVASTATIN CALCIUM 80 MG/1
1 TABLET, FILM COATED ORAL
Qty: 0 | Refills: 0 | DISCHARGE

## 2023-03-01 RX ORDER — SEMAGLUTIDE 0.68 MG/ML
0 INJECTION, SOLUTION SUBCUTANEOUS
Qty: 0 | Refills: 0 | DISCHARGE

## 2023-03-01 RX ORDER — CLOPIDOGREL BISULFATE 75 MG/1
1 TABLET, FILM COATED ORAL
Qty: 0 | Refills: 0 | DISCHARGE

## 2023-03-01 RX ORDER — ASPIRIN/CALCIUM CARB/MAGNESIUM 324 MG
1 TABLET ORAL
Qty: 0 | Refills: 0 | DISCHARGE

## 2023-03-01 RX ORDER — CLOTRIMAZOLE 10 MG
0 TROCHE MUCOUS MEMBRANE
Qty: 0 | Refills: 0 | DISCHARGE

## 2023-03-01 RX ORDER — CHOLECALCIFEROL (VITAMIN D3) 125 MCG
1 CAPSULE ORAL
Qty: 0 | Refills: 0 | DISCHARGE

## 2023-03-01 RX ORDER — OMEPRAZOLE 10 MG/1
1 CAPSULE, DELAYED RELEASE ORAL
Qty: 0 | Refills: 0 | DISCHARGE

## 2023-03-01 RX ORDER — ASPIRIN/CALCIUM CARB/MAGNESIUM 324 MG
325 TABLET ORAL ONCE
Refills: 0 | Status: COMPLETED | OUTPATIENT
Start: 2023-03-01 | End: 2023-03-01

## 2023-03-01 RX ORDER — ATORVASTATIN CALCIUM 80 MG/1
10 TABLET, FILM COATED ORAL AT BEDTIME
Refills: 0 | Status: DISCONTINUED | OUTPATIENT
Start: 2023-03-01 | End: 2023-03-02

## 2023-03-01 RX ORDER — SODIUM CHLORIDE 9 MG/ML
250 INJECTION INTRAMUSCULAR; INTRAVENOUS; SUBCUTANEOUS ONCE
Refills: 0 | Status: COMPLETED | OUTPATIENT
Start: 2023-03-01 | End: 2023-03-01

## 2023-03-01 RX ORDER — LANOLIN ALCOHOL/MO/W.PET/CERES
3 CREAM (GRAM) TOPICAL AT BEDTIME
Refills: 0 | Status: DISCONTINUED | OUTPATIENT
Start: 2023-03-01 | End: 2023-03-02

## 2023-03-01 RX ORDER — CYCLOBENZAPRINE HYDROCHLORIDE 10 MG/1
0 TABLET, FILM COATED ORAL
Qty: 0 | Refills: 0 | DISCHARGE

## 2023-03-01 RX ORDER — INFLUENZA VIRUS VACCINE 15; 15; 15; 15 UG/.5ML; UG/.5ML; UG/.5ML; UG/.5ML
0.5 SUSPENSION INTRAMUSCULAR ONCE
Refills: 0 | Status: DISCONTINUED | OUTPATIENT
Start: 2023-03-01 | End: 2023-03-02

## 2023-03-01 RX ORDER — NITROGLYCERIN 6.5 MG
1 CAPSULE, EXTENDED RELEASE ORAL
Qty: 0 | Refills: 0 | DISCHARGE

## 2023-03-01 RX ORDER — ASPIRIN/CALCIUM CARB/MAGNESIUM 324 MG
81 TABLET ORAL DAILY
Refills: 0 | Status: DISCONTINUED | OUTPATIENT
Start: 2023-03-02 | End: 2023-03-02

## 2023-03-01 RX ORDER — NATEGLINIDE 60 MG/1
1 TABLET, COATED ORAL
Qty: 0 | Refills: 0 | DISCHARGE

## 2023-03-01 RX ORDER — ACETAMINOPHEN 500 MG
650 TABLET ORAL EVERY 6 HOURS
Refills: 0 | Status: DISCONTINUED | OUTPATIENT
Start: 2023-03-01 | End: 2023-03-02

## 2023-03-01 RX ORDER — ALPRAZOLAM 0.25 MG
0 TABLET ORAL
Qty: 0 | Refills: 0 | DISCHARGE

## 2023-03-01 RX ORDER — METOPROLOL TARTRATE 50 MG
50 TABLET ORAL ONCE
Refills: 0 | Status: DISCONTINUED | OUTPATIENT
Start: 2023-03-01 | End: 2023-03-01

## 2023-03-01 RX ORDER — ONDANSETRON 8 MG/1
4 TABLET, FILM COATED ORAL EVERY 8 HOURS
Refills: 0 | Status: DISCONTINUED | OUTPATIENT
Start: 2023-03-01 | End: 2023-03-02

## 2023-03-01 RX ORDER — HEPARIN SODIUM 5000 [USP'U]/ML
5000 INJECTION INTRAVENOUS; SUBCUTANEOUS EVERY 8 HOURS
Refills: 0 | Status: DISCONTINUED | OUTPATIENT
Start: 2023-03-01 | End: 2023-03-02

## 2023-03-01 RX ADMIN — Medication 325 MILLIGRAM(S): at 08:56

## 2023-03-01 RX ADMIN — SODIUM CHLORIDE 250 MILLILITER(S): 9 INJECTION INTRAMUSCULAR; INTRAVENOUS; SUBCUTANEOUS at 13:29

## 2023-03-01 RX ADMIN — Medication 650 MILLIGRAM(S): at 22:29

## 2023-03-01 RX ADMIN — Medication 650 MILLIGRAM(S): at 16:28

## 2023-03-01 RX ADMIN — Medication 100 MILLIGRAM(S): at 00:45

## 2023-03-01 RX ADMIN — HEPARIN SODIUM 5000 UNIT(S): 5000 INJECTION INTRAVENOUS; SUBCUTANEOUS at 21:48

## 2023-03-01 RX ADMIN — Medication 650 MILLIGRAM(S): at 23:29

## 2023-03-01 RX ADMIN — ATORVASTATIN CALCIUM 10 MILLIGRAM(S): 80 TABLET, FILM COATED ORAL at 21:48

## 2023-03-01 NOTE — CONSULT NOTE ADULT - SUBJECTIVE AND OBJECTIVE BOX
[facility]                                              INTERVENTIONAL CARDIOLOGY AT [place] [address]                                             Telephone: [phone]. Fax:[phone]                                                       INTERVENTIONAL CARDIOLOGY CONSULTATION NOTE                                                                                             History obtained by: Patient and medical record  Community Cardiologist:   Reason for Consultation: Evaluation for cardiac catheterization  Available pt records reviewed: Yes [ x ] No [  ]    Chief complaint:    Patient is a 47y old  Male who presents with a chief complaint of EKG changes, plan for C (01 Mar 2023 09:02)      HPI:  46 yo male with hx of DLD presented from his PCP's office and was sent in for evaluation for EKG changes. He states that he went to his PCP for an annual physical exam and his PCP noted EKG changes from the previous year.  Patient reports that in 2019, he had empyema which required drainage and since that point he has episodes of dyspnea with exertion. Also reports that he had some light chest pressure but no other complaints. Reports some more fatigue. No other complaints. Seen by cardiology, admission is recommended for [facility].  (01 Mar 2023 09:02)      Anginal Class:        Angina (Class): I       Ischemic Symptoms: fatigue    Heart Failure:        Systolic/Diastolic/Combined: unknown       NYHA Class (within 2 weeks):       PAST MEDICAL HISTORY  No pertinent past medical history        Associated Risk Factors:        Frailty Assessment: (none/mild/mod/severe): None       Cerebrovascular Disease: N/A       Chronic Lung Disease: N/A       Peripheral Arterial Disease: N/A       Chronic Kidney Disease (if yes, what is GFR): N/A       Uncontrolled Diabetes (if yes, what is HgbA1C or FBS): N/A       Poorly Controlled Hypertension (if yes, what is SBP): N/A       Morbid Obesity (if yes, what is BMI): N/A       History of Recent Ventricular Arrhythmia: N/A       Inability to Ambulate Safely: N/A       Need for Therapeutic Anticoagulation: N/A       Antiplatelet or Contrast Allergy: N/A      PAST SURGICAL HISTORY    History of lung surgery      FAMILY HISTORY:  FH: HTN (hypertension) (Father)      Family History of Premature Cardiovascular Disease:  Yes [  ] No [  ]    HOME MEDICATIONS:  none    CURRENT CARDIAC MEDICATIONS:  none    Antianginal Therapies:        Beta Blockers:  n       Calcium Channel Blockers: n       Long Acting Nitrates: n       Ranexa: n    ALLERGIES:   No Known Allergies      REVIEW OF SYMPTOMS:   CONSTITUTIONAL: o fever, no chills, no weight loss, no weight gain, no fatigue   CARDIOVASCULAR: No c/o chest  RESPIRATORY: +BURNETT  : No dysuria, no hematuria   GI: No dark color stool, no nausea, no diarrhea, no constipation, no abdominal pain   NEURO: No headache, no slurred speech   ALL OTHER REVIEW OF SYSTEMS ARE NEGATIVE.    VITAL SIGNS:  T(C): 36.7 (03-01-23 @ 10:29), Max: 36.9 (02-28-23 @ 19:25)  T(F): 98.1 (03-01-23 @ 10:29), Max: 98.5 (02-28-23 @ 19:25)  HR: 70 (03-01-23 @ 10:29) (64 - 84)  BP: 161/87 (03-01-23 @ 10:29) (139/88 - 181/92)  RR: 13 (03-01-23 @ 10:29) (13 - 24)  SpO2: 95% (03-01-23 @ 10:29) (93% - 95%)         PHYSICAL EXAM:  Constitutional: Comfortable . No acute distress.   HEENT: Atraumatic and normocephalic , neck is supple . no JVD. No carotid bruit.  CNS: A&Ox3. No focal deficits.   Respiratory: CTAB, unlabored   Cardiovascular: RRR normal s1 s2. + murmur. No rubs or gallop.  Gastrointestinal: Soft, non-tender. +Bowel sounds.   Extremities: 2+ Peripheral Pulses, No clubbing, cyanosis, or edema  Psychiatric: Calm . no agitation.   Skin: Warm and dry, no ulcers on extremities     LABS:  ( 01 Mar 2023 07:55 )  Troponin T  <0.01,  CPK  X    , CKMB  X    , BNP X        , ( 01 Mar 2023 06:00 )  Troponin T  <0.01,  CPK  X    , CKMB  X    ,       , ( 01 Mar 2023 00:50 )  Troponin T  <0.01,  CPK  X    , CKMB  X    , BNP X                                  16.7   9.49  )-----------( 211      ( 28 Feb 2023 16:49 )             50.2     02-28    139  |  102  |  15.6  ----------------------------<  75  3.9   |  25.0  |  0.80    Ca    8.9      28 Feb 2023 16:49  Mg     1.9     03-01    TPro  7.5  /  Alb  3.9  /  TBili  0.5  /  DBili  x   /  AST  90<H>  /  ALT  188<H>  /  AlkPhos  88  02-28    PT/INR - ( 01 Mar 2023 00:50 )   PT: 12.8 sec;   INR: 1.10 ratio         PTT - ( 01 Mar 2023 00:50 )  PTT:29.5 sec    03-01-23 @ 06:00  CHolesterol: 172,  HDL: 29,  LDL: 123, Triglycerides: 98       Thyroid Stimulating Hormone, Serum: 0.78 uIU/mL (03-01-23 @ 06:00)        ECG: T wave inversion anterior septal  Prior ECG: Yes [ x ] No [  ]    CARDIAC TESTING   ECHO: pending    STRESS: No    Cardiac Interventions: No    CATH: No    ELECTROPHYSIOLOGY: No

## 2023-03-01 NOTE — H&P ADULT - NSHPPHYSICALEXAM_GEN_ALL_CORE
PHYSICAL EXAM:  Vital Signs Last 24 Hrs  T(F): 97.6 (01 Mar 2023 05:22), Max: 98.5 (28 Feb 2023 19:25)  HR: 67 (01 Mar 2023 07:39) (64 - 84)  BP: 139/88 (01 Mar 2023 07:39) (139/88 - 181/92)  RR: 18 (01 Mar 2023 07:39) (17 - 24)  SpO2: 95% (01 Mar 2023 07:39) (93% - 95%)    GENERAL: NAD, Resting in bed  Eyes: EOMI, PERRLA  ENMT: Conjunctiva and sclera clear; supple neck, No JVD  Cardiovascular: S1,S2, RRR, No murmur  Respiratory: CTA B/L, Non-labored breathing  GI: Bowel sounds present; Soft, Nontender, Nondistended. No hepatomegaly  Genitourinary: Deferred  Skin:  no breakdowns, ulcers or discharge, No rashes or lesions  Neurology: Alert & Oriented X3, non-focal and spontaneous movements of all extremities, CN 2 to 12 grossly intact   Psych: Appropriate mood and affect, calm, pleasant, Responds appropriately to questions

## 2023-03-01 NOTE — CONSULT NOTE ADULT - NS ATTEND AMEND GEN_ALL_CORE FT
seen with above,    47F history significant for obesity (BMI 34), HLD, prior pneumonia and empyema? was admitted to [facility] 2019 s/p drainage? 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 Segura to the interventionalist team for admission for left heart cath, patient denies change in his symptoms, has the same chronic chest pressure.   -TTE pending  -cancel CCTA since now directed for [facility]  -CXR with L-basilar infiltrate, recommend CT chest noncontrast given chronic exertional dyspnea with prior pneumonia        Terry Cancino DO, [facility]  Faculty Non-Invasive Cardiologist  [phone] seen with above,    47F history significant for obesity (BMI 34), HLD, prior pneumonia and empyema? was admitted to [facility] 2019 s/p drainage? 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 Segura to the interventionalist team for admission for left heart cath, Dr. Sourav Yang already at bedside for admission, patient denies change in his symptoms, has the same chronic chest pressure.   -TTE pending  -cancel CCTA since now directed for [facility]  -CXR with L-basilar infiltrate, recommend CT chest noncontrast given chronic exertional dyspnea with prior pneumonia        Terry Cancino DO, [facility]  Faculty Non-Invasive Cardiologist  [phone]

## 2023-03-01 NOTE — CONSULT NOTE ADULT - PROBLEM SELECTOR RECOMMENDATION 9
Pt went to his PCP for annual check up and was sent to ED for further evaluation of new TWI in anterior leads. Pt denies chest pain or SOB  Trop x 1 negative  EKG shows   Telemonitor  Trend CE. Trend trops x 3 q6. Serial EKGs  A1C, lipid panel fasting, TFTs, sed rate to ro comorbidities   Echo to assess structural and functional status  CCTA in the AM. Pt will need BB. Metoprolol 100 mg now and another dose prior to test with strict parameters  Maintain K+~4 and mag~2  DASH diet Pt went to his PCP for annual check up and was sent to ED for further evaluation of new TWI in anterior leads. Pt denies chest pain or SOB  Trop x 1 negative  EKG shows   D-dimer elevated. r/o PE  Telemonitor  Trend CE. Trend trops x 3 q6. Serial EKGs  Obtain A1C, lipid panel fasting, TFTs, sed rate to ro comorbidities   Echo to assess structural and functional status  CCTA in the AM. Pt will need BB. Metoprolol 100 mg now and another dose prior to test with strict parameters  Maintain K+~4 and mag~2  DASH diet

## 2023-03-01 NOTE — ED CDU PROVIDER SUBSEQUENT DAY NOTE - ATTENDING APP SHARED VISIT CONTRIBUTION OF CARE
: florinda.  Seen on rounds:  reports "a little pressure" on chest.  Has had intermittent exertional sob for years, gradually getting worse, occurring with reduced exercise/ exertion.  No assoc chest pain.  went to PMD yesterday for physical exam; found to have abnormal ECG and sent to the ED for eval.  Stable overnight with negative troponin.  Repeat ECG this morning with deepening ST depressions with T wave inversions in V1-3 concerning for [facility].  D/W with interventional cardiology for urgent cath.

## 2023-03-01 NOTE — ED CDU PROVIDER SUBSEQUENT DAY NOTE - HISTORY
No compliant or concern over night . VSS . 2nd trop negative. pending Am labs and Echo and CCTA as per cardiology

## 2023-03-01 NOTE — ED ADULT NURSE REASSESSMENT NOTE - NS ED NURSE REASSESS COMMENT FT1
rcd handoff report from offgoing RN 0730, pt is awake and alert orietnedx4, 0744 rcd call from cardiac monitor tech reports ST elevation, pt is sitting up in no apparent distress, reports "a little pain" to left axillary region, no signs of diaphoresis or SOB, EKG and repeat trop complete, pt to be reconsulted by cardio, hold echo, hold CTCA and metoprolol until further notice per CECILE Frias who is at bedside, will continue to monitor pt

## 2023-03-01 NOTE — PROGRESS NOTE ADULT - SUBJECTIVE AND OBJECTIVE BOX
Now s/p LHC via right radial with radial band in place . Procedure performed by Dr. Irvin.  Pt arrived to recovery in NAD and HDS.  RRA access site stable, no bleed/hematoma, distal pulse +.    Procedure results: S/P LHC which revealed non obs CAD and EDP 14.    Medication received during procedure:  Versed: 1mg  Fentanyl: 50 mcg  Heparin: 5000 u  Omnipaque: 68 ml    Exam:   Neuro: A&O X.  MERAZ=  CV: RRR  Lungs: CTA  Ext: + palp pulses.  Vascular access: Right radial band in place.  Site stable. No bleeding/hematoma/ecchymosis.  + cap refill     Plan:  -Formal cath report pending  -Post procedure management/monitoring per protocol  -Access site precautions  -Radial compression band removal at 1600  -Repeat ECG if any clinical indication or change on tele  -NS 250mL bolus post cath   -Continue current medical therapy  -F/U outpt in 1-2 weeks with Cardiologist Dr. Cancino  -Possible discharge today if cleared by hospitalist

## 2023-03-01 NOTE — ED CDU PROVIDER DISPOSITION NOTE - ATTENDING CONTRIBUTION TO CARE
placed on observation for extended evaluation of abnormal ECG.   repeat ECG demonstrates dynamic changes with deepening ST depressions with t-wave inversions in V1-3. Trop x4 negative. Case d/w interventional cardiology.  admitted for [facility].

## 2023-03-01 NOTE — CONSULT NOTE ADULT - PROBLEM SELECTOR RECOMMENDATION 2
Lipid panel fasting  Pt is not taking meds.  DASH diet      Further recommendations base on above findings Lipid panel fasting  Pt is not taking meds.  DASH diet      Further recommendations base on above findings  Case discussed with Dr Cancino

## 2023-03-01 NOTE — ED CDU PROVIDER DISPOSITION NOTE - NS ED MD DISPO SPECIAL CONSIDERATION1
Refill : oxybutynin (DITROPAN-XL) 10 MG 24 hr tablet    Call back # [phone]    [facility] # [phone]
None

## 2023-03-01 NOTE — CONSULT NOTE ADULT - SUBJECTIVE AND OBJECTIVE BOX
[facility]                                              CARDIOLOGY AT [place] [address]                                             Telephone: [phone]. Fax:[phone]      CARDIOLOGY CONSULTATION NOTE                                                                                             History obtained by: Patient and medical record  Community Cardiologist: Dr Canada   obtained: No   Reason for Consultation: Abnormal EKG    Chief complaint:   Patient is a 47y old  Male who presents with a chief complaint of abnormal EKG    HPI: Pt is 48 yo M with PMHx of HLD who presents to the ED co abnormal EKG. Patient when to his PCP for routine labs and EKG. He was found to have new TWI in anterior leads. Subsequently sent here for further evaluation. Does feel fatigued and has felt more tired the past two weeks. Pt is following pulmonologist for fatigue. Denies any active chest pain or SOB.     CARDIAC TESTING     PAST MEDICAL HISTORY  No pertinent past medical history    PAST SURGICAL HISTORY  No significant past surgical history    SOCIAL HISTORY: Denies smoking/alcohol/drugs    FAMILY HISTORY:  FH: HTN (hypertension) (Father)    Family History of Cardiovascular Disease:  Yes   Coronary Artery Disease in first degree relative: No   Sudden Cardiac Death in First degree relative: No     HOME MEDICATIONS:   None    CURRENT CARDIAC MEDICATIONS:   metoprolol tartrate 100 milliGRAM(s) Oral once, Stop order after: 1 Doses  metoprolol tartrate 100 milliGRAM(s) Oral once, Stop order after: 1 Doses    CURRENT OTHER MEDICATIONS:     ALLERGIES:   No Known Allergies    REVIEW OF SYMPTOMS:   CONSTITUTIONAL: No fever, no chills, no weight loss, no weight gain, + fatigue   ENMT:  No vertigo; No sinus or throat pain  NECK: No pain or stiffness  CARDIOVASCULAR: No chest pain, no dyspnea, no syncope/presyncope, no palpitations, no dizziness, no Orthopnea, no Paroxsymal nocturnal dyspnea  RESPIRATORY: No shortness of breath, no cough, no wheezing  : No dysuria, no hematuria   GI: no nausea, no diarrhea, no constipation, no abdominal pain,  NEURO: No headache, no slurred speech   MUSCULOSKELETAL: No joint pain or swelling; No muscle, back, or extremity pain  PSYCH: No agitation, no anxiety.    ALL OTHER REVIEW OF SYSTEMS ARE NEGATIVE.    VITAL SIGNS:   T(C): 36.9 (02-28-23 @ 19:25), Max: 36.9 (02-28-23 @ 19:25)  T(F): 98.5 (02-28-23 @ 19:25), Max: 98.5 (02-28-23 @ 19:25)  HR: 83 (02-28-23 @ 19:25) (83 - 84)  BP: 154/85 (02-28-23 @ 19:25) (154/85 - 181/92)  RR: 20 (02-28-23 @ 19:25) (20 - 20)  SpO2: 94% (02-28-23 @ 19:25) (93% - 94%)    PHYSICAL EXAM:   Constitutional: Comfortable . No acute distress.   HEENT: Atraumatic and normocephalic , neck is supple . no JVD. No carotid bruit  CNS: A&Ox3. No focal deficits.   Respiratory: CTAB, unlabored. No wheezing, No crackles or rhonchi   Cardiovascular: RRR normal s1 s2. + murmur 2ICS r to sternum . No rubs or gallop.  Gastrointestinal: Soft, non-tender. +Bowel sounds.   Extremities: 2+ Peripheral Pulses, No  edema  Psychiatric: Calm . no agitation.   Skin: Warm and dry    LABS:   ( 28 Feb 2023 16:49 )  Troponin T  <0.01,  CPK  X    , CKMB  X    , BNP X                              16.7   9.49  )-----------( 211      ( 28 Feb 2023 16:49 )             50.2     02-28    139  |  102  |  15.6  ----------------------------<  75  3.9   |  25.0  |  0.80    Ca    8.9      28 Feb 2023 16:49    TPro  7.5  /  Alb  3.9  /  TBili  0.5  /  DBili  x   /  AST  90<H>  /  ALT  188<H>  /  AlkPhos  88  02-28      ECG:   Prior ECG: Yes [  ] No [  ]    RADIOLOGY & ADDITIONAL STUDIES:       Preliminary evaluation, please await official recommendations     Assessment and recommendations are final when note is signed by the attending.                                      [facility]                                              CARDIOLOGY AT [place] [address]                                             Telephone: [phone]. Fax:[phone]      CARDIOLOGY CONSULTATION NOTE                                                                                             History obtained by: Patient and medical record  Community Cardiologist: Dr Canada   obtained: No   Reason for Consultation: Abnormal EKG    Chief complaint:   Patient is a 47y old  Male who presents with a chief complaint of abnormal EKG    HPI: Pt is 46 yo M with PMHx of HLD who presents to the ED co abnormal EKG. Patient when to his PCP for routine labs and EKG. He was found to have new TWI in anterior leads. Subsequently sent here for further evaluation. Does feel fatigued and has felt more tired the past two weeks. Pt is following pulmonologist for fatigue. Denies any active chest pain or SOB.     CARDIAC TESTING     PAST MEDICAL HISTORY  No pertinent past medical history    PAST SURGICAL HISTORY  No significant past surgical history    SOCIAL HISTORY: Denies smoking/alcohol/drugs    FAMILY HISTORY:  FH: HTN (hypertension) (Father)    Family History of Cardiovascular Disease:  Yes   Coronary Artery Disease in first degree relative: No   Sudden Cardiac Death in First degree relative: No     HOME MEDICATIONS:   None    CURRENT CARDIAC MEDICATIONS:   metoprolol tartrate 100 milliGRAM(s) Oral once, Stop order after: 1 Doses  metoprolol tartrate 100 milliGRAM(s) Oral once, Stop order after: 1 Doses    CURRENT OTHER MEDICATIONS:     ALLERGIES:   No Known Allergies    REVIEW OF SYMPTOMS:   CONSTITUTIONAL: No fever, no chills, no weight loss, no weight gain, + fatigue   ENMT:  No vertigo; No sinus or throat pain  NECK: No pain or stiffness  CARDIOVASCULAR: No chest pain, no dyspnea, no syncope/presyncope, no palpitations, no dizziness, no Orthopnea, no Paroxsymal nocturnal dyspnea  RESPIRATORY: No shortness of breath, no cough, no wheezing  : No dysuria, no hematuria   GI: no nausea, no diarrhea, no constipation, no abdominal pain,  NEURO: No headache, no slurred speech   MUSCULOSKELETAL: No joint pain or swelling; No muscle, back, or extremity pain  PSYCH: No agitation, no anxiety.    ALL OTHER REVIEW OF SYSTEMS ARE NEGATIVE.    VITAL SIGNS:   T(C): 36.9 (02-28-23 @ 19:25), Max: 36.9 (02-28-23 @ 19:25)  T(F): 98.5 (02-28-23 @ 19:25), Max: 98.5 (02-28-23 @ 19:25)  HR: 83 (02-28-23 @ 19:25) (83 - 84)  BP: 154/85 (02-28-23 @ 19:25) (154/85 - 181/92)  RR: 20 (02-28-23 @ 19:25) (20 - 20)  SpO2: 94% (02-28-23 @ 19:25) (93% - 94%)    PHYSICAL EXAM:   Constitutional: Comfortable . No acute distress.   HEENT: Atraumatic and normocephalic , neck is supple . no JVD. No carotid bruit  CNS: A&Ox3. No focal deficits.   Respiratory: CTAB, unlabored. No wheezing, No crackles or rhonchi   Cardiovascular: RRR normal s1 s2. + murmur 2ICS r to sternum . No rubs or gallop.  Gastrointestinal: Soft, non-tender. +Bowel sounds.   Extremities: 2+ Peripheral Pulses, No  edema  Psychiatric: Calm . no agitation.   Skin: Warm and dry    LABS:   ( 28 Feb 2023 16:49 )  Troponin T  <0.01,  CPK  X    , CKMB  X    , BNP X                              16.7   9.49  )-----------( 211      ( 28 Feb 2023 16:49 )             50.2     02-28    139  |  102  |  15.6  ----------------------------<  75  3.9   |  25.0  |  0.80    Ca    8.9      28 Feb 2023 16:49    TPro  7.5  /  Alb  3.9  /  TBili  0.5  /  DBili  x   /  AST  90<H>  /  ALT  188<H>  /  AlkPhos  88  02-28      ECG: NSR with TWI in anterior leads. No prior EKG to compare  Prior ECG: No     RADIOLOGY & ADDITIONAL STUDIES:       Preliminary evaluation, please await official recommendations     Assessment and recommendations are final when note is signed by the attending.                                      [facility]                                              CARDIOLOGY AT [place] [address]                                             Telephone: [phone]. Fax:[phone]      CARDIOLOGY CONSULTATION NOTE                                                                                             History obtained by: Patient and medical record  Community Cardiologist: None   obtained: No   Reason for Consultation: Abnormal EKG    Chief complaint:   Patient is a 47y old  Male who presents with a chief complaint of abnormal EKG    HPI: Pt is 46 yo M with PMHx of HLD who presents to the ED co abnormal EKG. Patient when to his PCP for routine labs and EKG. He was found to have new TWI in anterior leads. Subsequently sent here for further evaluation. Does feel fatigued and has felt more tired the past two weeks. Pt is following pulmonologist for fatigue. Denies any active chest pain or SOB.     CARDIAC TESTING     PAST MEDICAL HISTORY  No pertinent past medical history    PAST SURGICAL HISTORY  No significant past surgical history    SOCIAL HISTORY: Denies smoking/alcohol/drugs    FAMILY HISTORY:  FH: HTN (hypertension) (Father)    Family History of Cardiovascular Disease:  Yes   Coronary Artery Disease in first degree relative: No   Sudden Cardiac Death in First degree relative: No     HOME MEDICATIONS:   None    CURRENT CARDIAC MEDICATIONS:   metoprolol tartrate 100 milliGRAM(s) Oral once, Stop order after: 1 Doses  metoprolol tartrate 100 milliGRAM(s) Oral once, Stop order after: 1 Doses    CURRENT OTHER MEDICATIONS:     ALLERGIES:   No Known Allergies    REVIEW OF SYMPTOMS:   CONSTITUTIONAL: No fever, no chills, no weight loss, no weight gain, + fatigue   ENMT:  No vertigo; No sinus or throat pain  NECK: No pain or stiffness  CARDIOVASCULAR: No chest pain, no dyspnea, no syncope/presyncope, no palpitations, no dizziness, no Orthopnea, no Paroxsymal nocturnal dyspnea  RESPIRATORY: No shortness of breath, no cough, no wheezing  : No dysuria, no hematuria   GI: no nausea, no diarrhea, no constipation, no abdominal pain,  NEURO: No headache, no slurred speech   MUSCULOSKELETAL: No joint pain or swelling; No muscle, back, or extremity pain  PSYCH: No agitation, no anxiety.    ALL OTHER REVIEW OF SYSTEMS ARE NEGATIVE.    VITAL SIGNS:   T(C): 36.9 (02-28-23 @ 19:25), Max: 36.9 (02-28-23 @ 19:25)  T(F): 98.5 (02-28-23 @ 19:25), Max: 98.5 (02-28-23 @ 19:25)  HR: 83 (02-28-23 @ 19:25) (83 - 84)  BP: 154/85 (02-28-23 @ 19:25) (154/85 - 181/92)  RR: 20 (02-28-23 @ 19:25) (20 - 20)  SpO2: 94% (02-28-23 @ 19:25) (93% - 94%)    PHYSICAL EXAM:   Constitutional: Comfortable . No acute distress.   HEENT: Atraumatic and normocephalic , neck is supple . no JVD. No carotid bruit  CNS: A&Ox3. No focal deficits.   Respiratory: CTAB, unlabored. No wheezing, No crackles or rhonchi   Cardiovascular: RRR normal s1 s2. + murmur 2ICS r to sternum . No rubs or gallop.  Gastrointestinal: Soft, non-tender. +Bowel sounds.   Extremities: 2+ Peripheral Pulses, No  edema  Psychiatric: Calm . no agitation.   Skin: Warm and dry    LABS:   ( 28 Feb 2023 16:49 )  Troponin T  <0.01,  CPK  X    , CKMB  X    , BNP X                              16.7   9.49  )-----------( 211      ( 28 Feb 2023 16:49 )             50.2     02-28    139  |  102  |  15.6  ----------------------------<  75  3.9   |  25.0  |  0.80    Ca    8.9      28 Feb 2023 16:49    TPro  7.5  /  Alb  3.9  /  TBili  0.5  /  DBili  x   /  AST  90<H>  /  ALT  188<H>  /  AlkPhos  88  02-28      ECG: NSR with TWI in anterior leads. No prior EKG to compare  Prior ECG: No     RADIOLOGY & ADDITIONAL STUDIES:       Preliminary evaluation, please await official recommendations     Assessment and recommendations are final when note is signed by the attending.

## 2023-03-01 NOTE — ED CDU PROVIDER DISPOSITION NOTE - CLINICAL COURSE
46 yo male hx of hld presenting to the ED for ekg changes and intermittent chest discomfort, trops x 4 negative. cardiology recs were appreciated. upon AM round pt with  left axillary chest discomfort with deepening T wave inversions in anterior leads with now t wave inversions in V4 and lead III, cardiology reconsulted and recs for C. TBA for further management

## 2023-03-01 NOTE — H&P ADULT - ASSESSMENT
48 yo male with hx of DLD presented from his PCP's office and was sent in for evaluation for EKG changes. He states that he went to his PCP for an annual physical exam and his PCP noted EKG changes from the previous year.  Patient reports that in 2019, he had empyema which required drainage and since that point he has episodes of dyspnea with exertion. Also reports that he had some light chest pressure but no other complaints. Reports some more fatigue. No other complaints. Seen by cardiology, admission is recommended for [facility].     #Atypical chest pain to r/o ACS  EKG changes noted  Trops x 4 neg  Tele monitoring  TTE   [facility]  ASA, Statin  Cardiology recs appreciated    #DLD  Will start statin    #DVT prophylaxis: Heparin    Dispo: Pending [facility]/TTE 48 yo male with hx of DLD presented from his PCP's office and was sent in for evaluation for EKG changes. He states that he went to his PCP for an annual physical exam and his PCP noted EKG changes from the previous year.  Patient reports that in 2019, he had empyema which required drainage and since that point he has episodes of dyspnea with exertion. Also reports that he had some light chest pressure but no other complaints. Reports some more fatigue. No other complaints. Seen by cardiology, admission is recommended for [facility].     #Atypical chest pain to r/o ACS  EKG changes noted  Trops x 4 neg  Tele monitoring  D-dimer neg per age  TTE   [facility]  ASA, Statin  Cardiology recs appreciated    #DLD  Will start statin    #DVT prophylaxis: Heparin    Dispo: Pending [facility]/TTE 46 yo male with hx of DLD presented from his PCP's office and was sent in for evaluation for EKG changes. He states that he went to his PCP for an annual physical exam and his PCP noted EKG changes from the previous year.  Patient reports that in 2019, he had empyema which required drainage and since that point he has episodes of dyspnea with exertion. Also reports that he had some light chest pressure but no other complaints. Reports some more fatigue. No other complaints. Seen by cardiology, admission is recommended for [facility].     #Atypical chest pain to r/o ACS  EKG changes noted  Trops x 4 neg  Tele monitoring  D-dimer neg per age  TTE   C  ASA, Statin  Cardiology recs appreciated    #Left sided infiltrate?  Possible noted on CXR  Will obtain CT chest for assessment     #DLD  Will start statin    #DVT prophylaxis: Heparin    Dispo: Pending [facility]/TTE

## 2023-03-01 NOTE — PATIENT PROFILE ADULT - FUNCTIONAL ASSESSMENT - BASIC MOBILITY 6.
4-calculated by average/Not able to assess (calculate score using [facility] averaging method)

## 2023-03-01 NOTE — CONSULT NOTE ADULT - ASSESSMENT
Indication: Abnormal EKG  SYLVAIN Score: 1    Risk Stratification:  ASA: 3  Mallampati: 2  Bleeding Risk: 1.1%  Creatinine: 0.80  GFR: 108    Coronary anatomy: unknown    Plan/Recommendations:   -plan for [facility]  -preferred access: RRA   -patient seen and examined  -confirmed appropriate NPO duration  -ECG and Labs reviewed  -Aspirin  po pre-cath  -NS 250mL IV bolus pre-cath  -procedure discussed with patient; risks and benefits explained, questions answered  -consent obtained by attending IC

## 2023-03-01 NOTE — ED CDU PROVIDER SUBSEQUENT DAY NOTE - PROGRESS NOTE DETAILS
left mid axillary discomfort repeat EKG now with deepening T wave inversions in anterior lead V1-V3 and now flipped T's in III and V4  cardiology reconsulted due to chest pain and EKG changes

## 2023-03-01 NOTE — ED CDU PROVIDER DISPOSITION NOTE - PRINCIPAL DIAGNOSIS
-- DO NOT REPLY / DO NOT REPLY ALL --  -- Message is from the [facility]--    Order Request  X Ray of right knee     Message / reason: Patient stating having knee pain and requesting a order to get a x-ray please call .    Insurance type: yes   Payor: [facility] MEDICAID / Plan: [facility] MEDICAID HMO / Product Type: T19 HMO    Preferred Delivery Method   Fax - number to send to: epic      Caller Information       Type Contact Phone    10/13/2020 08:28 AM CDT Phone (Incoming) Farzana Lewis (Self) [phone] (H)          Alternative phone number:     Turnaround time given to caller:   \"This message will be sent to [state Provider's name]. The clinical team will fulfill your request as soon as they review your message.\"   Chest pain

## 2023-03-01 NOTE — ED CDU PROVIDER SUBSEQUENT DAY NOTE - CLINICAL SUMMARY MEDICAL DECISION MAKING FREE TEXT BOX
47 Y.o male states hx of HLD not on med sent from his PMD's office when he was found to have new T wave inversion in anterior leads. Subsequently sent here for further evaluation. Denies any active chest pain or SOB. Does feel fatigued and has felt more tired the past two weeks. 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 denies any smoking or drinking or use drugs . he received asa at his primary care clinic as per pt he denies any hx of CAD in his family mom or dad  PT's EKG has Inverted T On V1,2,3 No St elevation   added serial trop x 2 negative   pro BNP   mild elevated DD: Dr barakat spoke with [facility] as per DR fitzpatrick dose not need Ac therapy    ECHo and CCTA in AM   RVP negative  metoprolol order added by [facility] team   pending Am labs     elevated LFT possible fatty liver - abd exam Wnl and possible lipoma

## 2023-03-01 NOTE — H&P ADULT - HISTORY OF PRESENT ILLNESS
46 yo male with hx of DLD presented from his PCP's office and was sent in for evaluation for EKG changes. He states that he went to his PCP for an annual physical exam and his PCP noted EKG changes from the previous year.  Patient reports that in 2019, he had empyema which required drainage and since that point he has episodes of dyspnea with exertion. Also reports that he had some light chest pressure but no other complaints. Reports some more fatigue. No other complaints. Seen by cardiology, admission is recommended for [facility].

## 2023-03-02 ENCOUNTER — TRANSCRIPTION ENCOUNTER (OUTPATIENT)
Age: 48
End: 2023-03-02

## 2023-03-02 VITALS
RESPIRATION RATE: 18 BRPM | OXYGEN SATURATION: 97 % | SYSTOLIC BLOOD PRESSURE: 132 MMHG | HEART RATE: 72 BPM | DIASTOLIC BLOOD PRESSURE: 76 MMHG | TEMPERATURE: 98 F

## 2023-03-02 LAB
ALBUMIN SERPL ELPH-MCNC: 3.8 G/DL — SIGNIFICANT CHANGE UP (ref 3.3–5.2)
ALP SERPL-CCNC: 82 U/L — SIGNIFICANT CHANGE UP (ref 40–120)
ALT FLD-CCNC: 201 U/L — HIGH
ANION GAP SERPL CALC-SCNC: 11 MMOL/L — SIGNIFICANT CHANGE UP (ref 5–17)
AST SERPL-CCNC: 82 U/L — HIGH
BASOPHILS # BLD AUTO: 0.03 K/UL — SIGNIFICANT CHANGE UP (ref 0–0.2)
BASOPHILS NFR BLD AUTO: 0.5 % — SIGNIFICANT CHANGE UP (ref 0–2)
BILIRUB SERPL-MCNC: 0.4 MG/DL — SIGNIFICANT CHANGE UP (ref 0.4–2)
BUN SERPL-MCNC: 16 MG/DL — SIGNIFICANT CHANGE UP (ref 8–20)
CALCIUM SERPL-MCNC: 9.2 MG/DL — SIGNIFICANT CHANGE UP (ref 8.4–10.5)
CHLORIDE SERPL-SCNC: 104 MMOL/L — SIGNIFICANT CHANGE UP (ref 96–108)
CO2 SERPL-SCNC: 25 MMOL/L — SIGNIFICANT CHANGE UP (ref 22–29)
CREAT SERPL-MCNC: 0.77 MG/DL — SIGNIFICANT CHANGE UP (ref 0.5–1.3)
EGFR: 110 ML/MIN/1.73M2 — SIGNIFICANT CHANGE UP
EOSINOPHIL # BLD AUTO: 0.39 K/UL — SIGNIFICANT CHANGE UP (ref 0–0.5)
EOSINOPHIL NFR BLD AUTO: 6.6 % — HIGH (ref 0–6)
GLUCOSE SERPL-MCNC: 93 MG/DL — SIGNIFICANT CHANGE UP (ref 70–99)
HCT VFR BLD CALC: 48.6 % — SIGNIFICANT CHANGE UP (ref 39–50)
HGB BLD-MCNC: 16 G/DL — SIGNIFICANT CHANGE UP (ref 13–17)
IMM GRANULOCYTES NFR BLD AUTO: 0.7 % — SIGNIFICANT CHANGE UP (ref 0–0.9)
LYMPHOCYTES # BLD AUTO: 1.52 K/UL — SIGNIFICANT CHANGE UP (ref 1–3.3)
LYMPHOCYTES # BLD AUTO: 25.8 % — SIGNIFICANT CHANGE UP (ref 13–44)
MCHC RBC-ENTMCNC: 29.6 PG — SIGNIFICANT CHANGE UP (ref 27–34)
MCHC RBC-ENTMCNC: 32.9 GM/DL — SIGNIFICANT CHANGE UP (ref 32–36)
MCV RBC AUTO: 90 FL — SIGNIFICANT CHANGE UP (ref 80–100)
MONOCYTES # BLD AUTO: 0.54 K/UL — SIGNIFICANT CHANGE UP (ref 0–0.9)
MONOCYTES NFR BLD AUTO: 9.2 % — SIGNIFICANT CHANGE UP (ref 2–14)
NEUTROPHILS # BLD AUTO: 3.38 K/UL — SIGNIFICANT CHANGE UP (ref 1.8–7.4)
NEUTROPHILS NFR BLD AUTO: 57.2 % — SIGNIFICANT CHANGE UP (ref 43–77)
PLATELET # BLD AUTO: 193 K/UL — SIGNIFICANT CHANGE UP (ref 150–400)
POTASSIUM SERPL-MCNC: 4.4 MMOL/L — SIGNIFICANT CHANGE UP (ref 3.5–5.3)
POTASSIUM SERPL-SCNC: 4.4 MMOL/L — SIGNIFICANT CHANGE UP (ref 3.5–5.3)
PROT SERPL-MCNC: 6.8 G/DL — SIGNIFICANT CHANGE UP (ref 6.6–8.7)
RBC # BLD: 5.4 M/UL — SIGNIFICANT CHANGE UP (ref 4.2–5.8)
RBC # FLD: 12.8 % — SIGNIFICANT CHANGE UP (ref 10.3–14.5)
SODIUM SERPL-SCNC: 140 MMOL/L — SIGNIFICANT CHANGE UP (ref 135–145)
WBC # BLD: 5.9 K/UL — SIGNIFICANT CHANGE UP (ref 3.8–10.5)
WBC # FLD AUTO: 5.9 K/UL — SIGNIFICANT CHANGE UP (ref 3.8–10.5)

## 2023-03-02 PROCEDURE — 93458 L HRT ARTERY/VENTRICLE ANGIO: CPT

## 2023-03-02 PROCEDURE — 85652 RBC SED RATE AUTOMATED: CPT

## 2023-03-02 PROCEDURE — T1013: CPT

## 2023-03-02 PROCEDURE — 84443 ASSAY THYROID STIM HORMONE: CPT

## 2023-03-02 PROCEDURE — 85025 COMPLETE CBC W/AUTO DIFF WBC: CPT

## 2023-03-02 PROCEDURE — C1769: CPT

## 2023-03-02 PROCEDURE — 84480 ASSAY TRIIODOTHYRONINE (T3): CPT

## 2023-03-02 PROCEDURE — 83880 ASSAY OF NATRIURETIC PEPTIDE: CPT

## 2023-03-02 PROCEDURE — 93005 ELECTROCARDIOGRAM TRACING: CPT

## 2023-03-02 PROCEDURE — 80053 COMPREHEN METABOLIC PANEL: CPT

## 2023-03-02 PROCEDURE — 99285 EMERGENCY DEPT VISIT HI MDM: CPT

## 2023-03-02 PROCEDURE — 71250 CT THORAX DX C-: CPT

## 2023-03-02 PROCEDURE — 84484 ASSAY OF TROPONIN QUANT: CPT

## 2023-03-02 PROCEDURE — 83036 HEMOGLOBIN GLYCOSYLATED A1C: CPT

## 2023-03-02 PROCEDURE — 71045 X-RAY EXAM CHEST 1 VIEW: CPT

## 2023-03-02 PROCEDURE — 85730 THROMBOPLASTIN TIME PARTIAL: CPT

## 2023-03-02 PROCEDURE — 85610 PROTHROMBIN TIME: CPT

## 2023-03-02 PROCEDURE — 85379 FIBRIN DEGRADATION QUANT: CPT

## 2023-03-02 PROCEDURE — 80061 LIPID PANEL: CPT

## 2023-03-02 PROCEDURE — 84436 ASSAY OF TOTAL THYROXINE: CPT

## 2023-03-02 PROCEDURE — C1894: CPT

## 2023-03-02 PROCEDURE — 99239 HOSP IP/OBS DSCHRG MGMT >30: CPT

## 2023-03-02 PROCEDURE — 0225U NFCT DS DNA&RNA 21 SARSCOV2: CPT

## 2023-03-02 PROCEDURE — 83735 ASSAY OF MAGNESIUM: CPT

## 2023-03-02 PROCEDURE — C1887: CPT

## 2023-03-02 PROCEDURE — 36415 COLL VENOUS BLD VENIPUNCTURE: CPT

## 2023-03-02 PROCEDURE — C8929: CPT

## 2023-03-02 RX ORDER — ATORVASTATIN CALCIUM 80 MG/1
1 TABLET, FILM COATED ORAL
Qty: 0 | Refills: 0 | DISCHARGE
Start: 2023-03-02

## 2023-03-02 RX ORDER — ASPIRIN/CALCIUM CARB/MAGNESIUM 324 MG
1 TABLET ORAL
Qty: 0 | Refills: 0 | DISCHARGE
Start: 2023-03-02

## 2023-03-02 RX ORDER — ACETAMINOPHEN 500 MG
2 TABLET ORAL
Qty: 0 | Refills: 0 | DISCHARGE
Start: 2023-03-02

## 2023-03-02 RX ADMIN — HEPARIN SODIUM 5000 UNIT(S): 5000 INJECTION INTRAVENOUS; SUBCUTANEOUS at 05:35

## 2023-03-02 RX ADMIN — Medication 81 MILLIGRAM(S): at 12:00

## 2023-03-02 RX ADMIN — Medication 200 MILLIGRAM(S): at 06:39

## 2023-03-02 NOTE — DISCHARGE NOTE PROVIDER - ATTENDING DISCHARGE PHYSICAL EXAMINATION:
Vital Signs Last 24 Hrs  T(C): 36.6 (02 Mar 2023 10:16), Max: 36.7 (02 Mar 2023 04:07)  T(F): 97.8 (02 Mar 2023 10:16), Max: 98.1 (02 Mar 2023 04:07)  HR: 67 (02 Mar 2023 10:16) (67 - 80)  BP: 129/72 (02 Mar 2023 10:16) (126/73 - 166/94)  BP(mean): --  RR: 18 (02 Mar 2023 10:16) (14 - 18)  SpO2: 95% (02 Mar 2023 10:16) (93% - 97%)    Parameters below as of 02 Mar 2023 10:16  Patient On (Oxygen Delivery Method): room air    GENERAL: NAD, in no acute distress   ENMT: supple neck  Cardiovascular: S1,S2, RRR, No murmur  Respiratory: CTA B/L, Non-labored breathing  GI: Bowel sounds present; Soft, Nontender, Nondistended. No hepatomegaly  Skin:  no breakdowns, ulcers or discharge, No rashes or lesions  Neurology: Alert & Oriented X3, nonfocal

## 2023-03-02 NOTE — DISCHARGE NOTE PROVIDER - PROVIDER TOKENS
PROVIDER:[TOKEN:[[id number]]],FREE:[LAST:[primary care],PHONE:[(   )    -],FAX:[(   )    -],FOLLOWUP:[1-3 days]],PROVIDER:[TOKEN:[[id number]],FOLLOWUP:[1 week]]

## 2023-03-02 NOTE — DISCHARGE NOTE PROVIDER - CARE PROVIDER_API CALL
Don Arauz)  Critical Care Medicine; Internal Medicine; Pulmonary Disease  [address], [address]  [place] [address]  Phone: [phone]  Fax: [phone]  Follow Up Time:     primary care,   Phone: (   )    -  Fax: (   )    -  Follow Up Time: 1-3 days    Terry Cancino Can (DO)  Cardiovascular Disease; Internal Medicine  [address] [place] [address]  Phone: [phone]  Fax: [phone]  Follow Up Time: 1 week

## 2023-03-02 NOTE — DISCHARGE NOTE PROVIDER - HOSPITAL COURSE
48 yo male with hx of DLD presented from his PCP's office and was sent in for evaluation for EKG changes. He states that he went to his PCP for an annual physical exam and his PCP noted EKG changes from the previous year.  Patient reports that in 2019, he had empyema which required drainage and since that point he has episodes of dyspnea with exertion. Also reports that he had some light chest pressure but no other complaints. Atypical chest pain to r/o ACS. s/p cath. Reports some more fatigue. No other complaints. Seen by cardiology, admission is recommended for [facility]. Continue current medical therapy. D-dimer neg per age. F/U outpt in 1-2 weeks with Cardiologist Dr. Cancino. c/w ASA, Statin    patient also noted with possible Left sided infiltrate CXR. < from: CT Chest No Cont (03.01.23 @ 18:00) >Patchy ground-glass opacities throughout the left lower lobe multifocally   may represent infectious or inflammatory process and recommend CT chest follow-up in 3 months to ensure clearing. Nodularity along the left major fissure and left mediastinal pleura; this is of unclear etiology, but malignancy/metastasis is a diagnostic consideration. Indeterminate 1.4 cm nodule along the right major fissure. Central airway stricturing and occlusion as described above. Suspected focal narrowing of the central right lower lobe pulmonary  artery; vascular structures are not adequately assessed without intravenous contrast. findings discussed with patient. patient has followed with dr. gerardo ndiaye in past. patient lost follow up during covid. patient to follow up out patient with dr. powers tomorrow for follow up on ct changes and further plan of care and testing.  appointment scheduled.

## 2023-03-02 NOTE — DISCHARGE NOTE PROVIDER - NSDCCPCAREPLAN_GEN_ALL_CORE_FT
PRINCIPAL DISCHARGE DIAGNOSIS  Diagnosis: Chest pain  Assessment and Plan of Treatment: atypical   follow up with cardiology as out patient as advised.      SECONDARY DISCHARGE DIAGNOSES  Diagnosis: Abnormal chest CT  Assessment and Plan of Treatment: you are noted to have ct chest findings. follow up with pulmonology tomorrow at 11: 45 am for further work up and testing.

## 2023-03-02 NOTE — DISCHARGE NOTE NURSING/CASE MANAGEMENT/SOCIAL WORK - PATIENT PORTAL LINK FT
You can access the FollowMyHealth Patient Portal offered by [facility] by registering at the following website: [email or web address]. By joining [facility]’s FollowMyHealth portal, you will also be able to view your health information using other applications (apps) compatible with our system.

## 2023-03-02 NOTE — DISCHARGE NOTE PROVIDER - NSDCMRMEDTOKEN_GEN_ALL_CORE_FT
acetaminophen 325 mg oral tablet: 2 tab(s) orally every 6 hours, As needed, Temp greater or equal to 38C (100.4F), Mild Pain (1 - 3)  aspirin 81 mg oral tablet, chewable: 1 tab(s) orally once a day  atorvastatin 10 mg oral tablet: 1 tab(s) orally once a day (at bedtime)

## 2023-03-02 NOTE — DISCHARGE NOTE NURSING/CASE MANAGEMENT/SOCIAL WORK - NSDCPEFALRISK_GEN_ALL_CORE
For information on Fall & Injury Prevention, visit: [email or web address] OR  [email or web address] OR  https://www.cdc.gov/steadi/patient.html

## 2023-03-03 ENCOUNTER — APPOINTMENT (OUTPATIENT)
Dept: PULMONOLOGY | Facility: CLINIC | Age: 48
End: 2023-03-03
Payer: COMMERCIAL

## 2023-03-03 VITALS
WEIGHT: 204 LBS | BODY MASS INDEX: 33.99 KG/M2 | HEIGHT: 65 IN | DIASTOLIC BLOOD PRESSURE: 82 MMHG | SYSTOLIC BLOOD PRESSURE: 142 MMHG | RESPIRATION RATE: 16 BRPM

## 2023-03-03 VITALS — HEART RATE: 109 BPM | OXYGEN SATURATION: 94 %

## 2023-03-03 PROBLEM — Z78.9 OTHER SPECIFIED HEALTH STATUS: Chronic | Status: ACTIVE | Noted: 2023-02-28

## 2023-03-03 PROCEDURE — 99496 TRANSJ CARE MGMT HIGH F2F 7D: CPT

## 2023-03-03 NOTE — DISCUSSION/SUMMARY
[FreeTextEntry1] : 47-year-old male seen today for the above.  Findings on CAT scan are consistent with old fibrotic disease with compromised to the pulmonary vasculature without significant evidence of pulmonary hypertension.  Questionable lesion may be related to prior scarring but will rule out malignancy.  I have requested that radiology review his current CAT scan with previous CAT scans from 2019.  Patient will undergo a PET/CT before his next visit as well as pulmonary function tests at that visit.  He has been encouraged to follow-up with cardiology

## 2023-03-03 NOTE — END OF VISIT
[Time Spent: ___ minutes] : I have spent [unfilled] minutes of time on the encounter. [FreeTextEntry3] : Hospitalization reviewed including PACS

## 2023-03-03 NOTE — CONSULT LETTER
[Dear  ___] : Dear  [unfilled], [Consult Letter:] : I had the pleasure of evaluating your patient, [unfilled]. [Please see my note below.] : Please see my note below. [Consult Closing:] : Thank you very much for allowing me to participate in the care of this patient.  If you have any questions, please do not hesitate to contact me. [Sincerely,] : Sincerely, [FreeTextEntry3] : Don Arauz MD FCCP\par Pulmonary/Critical Care/Sleep Medicine\par Department of Internal Medicine\par \par [facility]\par

## 2023-03-03 NOTE — PROCEDURE
[FreeTextEntry1] : 8/7/2019: [facility] CT angiography-small partially calcified lymph nodes present in the hilar regions.  These are causing narrowing of the central bronchi as well as narrowing portions of the right main and left main pulmonary arteries.  Left main pulmonary artery measures 4.3 cm.  No flow of IV contrast into the pulmonary arteries within the left lung is noted.  This is likely secondary to narrowing/stenosis of the distal left main pulmonary artery by partially calcified left hilar adenopathy.  No filling defects are noted.  As described also distal portion of the right main proximal right lower lobe pulmonary artery segment is noted.  Multiple calcified granuloma over the right upper lobe.  No acute pulmonary emboli was seen\par \par ACC: [id number] EXAM: CT CHEST ORDERED BY: ABIGAIL MORALES\par \par PROCEDURE DATE: 03/01/2023\par \par \par \par INTERPRETATION: HISTORY: Admitting Dxs: R07.9 CHEST PAIN, UNSPECIFIED. Possible left-sided infiltrate.\par \par EXAMINATION: CT CHEST was performed without IV contrast.\par \par COMPARISON: No prior CT chest comparison available.\par \par FINDINGS:\par \par There is occlusion of the right upper lobe bronchus and narrowing of the bronchus intermedius and occlusion of the right middle lobe bronchus. There is narrowing of the central left upper lobe airways, including occlusion of the apical posterior segmental airway proximally.\par \par Nodular thickening along the left major fissure and mediastinal pleural thickening.\par \par Left lung wedge resection.\par \par Patchy ground-glass opacities at the left base multifocally.\par \par Bilateral upper lobe linear atelectasis/scarring.\par \par 1.4 cm nodular opacity in the right upper lobe lung the major fissure.\par \par Calcified lung granulomas. Few calcified hilar nodes suspected.\par \par No pleural effusion.\par \par There is suggestion of focal stricturing of of the central right lower lobe pulmonary artery. Main pulmonary artery diameter measures 4.1 cm.\par \par Normal caliber thoracic aorta. No large mediastinal nodes. Heart size normal. No pericardial effusion.\par \par Mild bilateral gynecomastia. Hepatic hypodense lesion measuring 1.2 cm, likely cyst. Contrast within the renal collecting system/proximal ureters. Osseous structures unremarkable.\par \par IMPRESSION:.\par \par Patchy ground-glass opacities throughout the left lower lobe multifocally may represent infectious or inflammatory process and recommend CT chest follow-up in 3 months to ensure clearing.\par \par Nodularity along the left major fissure and left mediastinal pleura; this is of unclear etiology, but malignancy/metastasis is a diagnostic consideration.\par \par Indeterminate 1.4 cm nodule along the right major fissure.\par \par Central airway stricturing and occlusion as described above.\par \par Suspected focal narrowing of the central right lower lobe pulmonary artery; vascular structures are not adequately assessed without intravenous contrast.\par \par Recommend correlation with outside CT chest exam if available.\par \par ONESIMO MARIE MD; Attending Radiologist\par This document has been electronically signed. Mar 1 2023 6:38PM\par \par 3/1/2023:\par Summary:\par  1. Left ventricular ejection fraction, by visual estimation, is 65 to 70%.\par  2. Normal global left ventricular systolic function.\par  3. Mildly increased LV wall thickness.\par  4. Spectral Doppler shows pseudonormal pattern of left ventricular myocardial filling (Grade II diastolic dysfunction).\par  5. There is mild septal left ventricular hypertrophy.\par  6. Normal left atrial size.\par  7. Normal right atrial size.\par  8. Mild thickening of the anterior and posterior mitral valve leaflets.\par  9. Mild to moderate mitral valve regurgitation.\par 10. Moderate mitral annular calcification.\par 11. Sclerotic aortic valve with normal opening.\par \par MD Debbi Electronically signed on 3/1/2023 at 3:27:52 PM

## 2023-03-06 ENCOUNTER — APPOINTMENT (OUTPATIENT)
Dept: CARDIOLOGY | Facility: CLINIC | Age: 48
End: 2023-03-06
Payer: COMMERCIAL

## 2023-03-06 ENCOUNTER — NON-APPOINTMENT (OUTPATIENT)
Age: 48
End: 2023-03-06

## 2023-03-06 VITALS
DIASTOLIC BLOOD PRESSURE: 78 MMHG | TEMPERATURE: 98.8 F | HEIGHT: 65 IN | WEIGHT: 201.13 LBS | OXYGEN SATURATION: 95 % | BODY MASS INDEX: 33.51 KG/M2 | HEART RATE: 91 BPM | SYSTOLIC BLOOD PRESSURE: 138 MMHG

## 2023-03-06 DIAGNOSIS — R06.00 DYSPNEA, UNSPECIFIED: ICD-10-CM

## 2023-03-06 DIAGNOSIS — E78.5 HYPERLIPIDEMIA, UNSPECIFIED: ICD-10-CM

## 2023-03-06 DIAGNOSIS — R94.31 ABNORMAL ELECTROCARDIOGRAM [ECG] [EKG]: ICD-10-CM

## 2023-03-06 DIAGNOSIS — Z09 ENCOUNTER FOR FOLLOW-UP EXAMINATION AFTER COMPLETED TREATMENT FOR CONDITIONS OTHER THAN MALIGNANT NEOPLASM: ICD-10-CM

## 2023-03-06 PROCEDURE — 93000 ELECTROCARDIOGRAM COMPLETE: CPT

## 2023-03-06 PROCEDURE — 99214 OFFICE O/P EST MOD 30 MIN: CPT | Mod: 25

## 2023-03-07 ENCOUNTER — RESULT REVIEW (OUTPATIENT)
Age: 48
End: 2023-03-07

## 2023-03-10 ENCOUNTER — APPOINTMENT (OUTPATIENT)
Dept: NUCLEAR MEDICINE | Facility: CLINIC | Age: 48
End: 2023-03-10
Payer: COMMERCIAL

## 2023-03-10 ENCOUNTER — OUTPATIENT (OUTPATIENT)
Dept: OUTPATIENT SERVICES | Facility: HOSPITAL | Age: 48
LOS: 1 days | End: 2023-03-10

## 2023-03-10 DIAGNOSIS — R91.8 OTHER NONSPECIFIC ABNORMAL FINDING OF LUNG FIELD: ICD-10-CM

## 2023-03-10 DIAGNOSIS — Z98.890 OTHER SPECIFIED POSTPROCEDURAL STATES: Chronic | ICD-10-CM

## 2023-03-10 PROCEDURE — 78815 PET IMAGE W/CT SKULL-THIGH: CPT | Mod: 26,PI

## 2023-03-14 ENCOUNTER — NON-APPOINTMENT (OUTPATIENT)
Age: 48
End: 2023-03-14

## 2023-04-25 ENCOUNTER — APPOINTMENT (OUTPATIENT)
Dept: PULMONOLOGY | Facility: CLINIC | Age: 48
End: 2023-04-25
Payer: COMMERCIAL

## 2023-04-25 VITALS — HEIGHT: 64 IN | WEIGHT: 201 LBS | BODY MASS INDEX: 34.31 KG/M2

## 2023-04-25 VITALS
DIASTOLIC BLOOD PRESSURE: 80 MMHG | OXYGEN SATURATION: 96 % | HEART RATE: 87 BPM | SYSTOLIC BLOOD PRESSURE: 138 MMHG | RESPIRATION RATE: 16 BRPM

## 2023-04-25 DIAGNOSIS — R05.3 CHRONIC COUGH: ICD-10-CM

## 2023-04-25 PROCEDURE — 99214 OFFICE O/P EST MOD 30 MIN: CPT | Mod: 25

## 2023-04-25 PROCEDURE — 85018 HEMOGLOBIN: CPT | Mod: QW

## 2023-04-25 PROCEDURE — 94729 DIFFUSING CAPACITY: CPT

## 2023-04-25 PROCEDURE — 94727 GAS DIL/WSHOT DETER LNG VOL: CPT

## 2023-04-25 PROCEDURE — 94010 BREATHING CAPACITY TEST: CPT

## 2023-04-26 NOTE — HISTORY OF PRESENT ILLNESS
[Never] : never [TextBox_4] : 3/3/2023:\par 47-year-old male, native [place] seen today after a 3-1/2-year hiatus from this office.  Patient was found in 2019 to have a left hilar mass occluding the pulmonary artery with associated left pleural effusion at [facility].  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  He was evaluated by cardiology at that time with no clear indication for treatment.\par \par Patient is status post discharge from [facility] from 3/1-3/2/2023.  He was sent from his primary care doctor's office to the ER for EKG changes.  His course as described above is also complicated by an empyema in 2019 which required drainage.  He has been continuing to complain of episodes of dyspnea with exertion as well as atypical chest pressure.  Patient was admitted and underwent a left heart catheterization which was reported as unremarkable.  D-dimer was also negative.  CAT scan performed as well as echocardiogram described below.  Patient denies any hemoptysis fevers chills lightheadedness or dizziness.  (Hospitalization reviewed on sunrise)\par \par It was requested that his prior films be compared to his recent images and a PET/CT as well as pulmonary functions were ordered.\par \par 4/25/2023:\par Patient denies any complaints of cough wheeze shortness of breath chest pains palpitations lightheadedness or dizziness.  He did have a CAT scan of the chest which is reviewed below

## 2023-04-26 NOTE — PROCEDURE
[FreeTextEntry1] : 8/7/2019: [facility] CT angiography-small partially calcified lymph nodes present in the hilar regions.  These are causing narrowing of the central bronchi as well as narrowing portions of the right main and left main pulmonary arteries.  Left main pulmonary artery measures 4.3 cm.  No flow of IV contrast into the pulmonary arteries within the left lung is noted.  This is likely secondary to narrowing/stenosis of the distal left main pulmonary artery by partially calcified left hilar adenopathy.  No filling defects are noted.  As described also distal portion of the right main proximal right lower lobe pulmonary artery segment is noted.  Multiple calcified granuloma over the right upper lobe.  No acute pulmonary emboli was seen\par \par ACC: [id number] EXAM: CT CHEST ORDERED BY: ABIGAIL MORALES\par \par PROCEDURE DATE: 03/01/2023\par \par \par \par INTERPRETATION: HISTORY: Admitting Dxs: R07.9 CHEST PAIN, UNSPECIFIED. Possible left-sided infiltrate.\par \par EXAMINATION: CT CHEST was performed without IV contrast.\par \par COMPARISON: No prior CT chest comparison available.\par \par FINDINGS:\par \par There is occlusion of the right upper lobe bronchus and narrowing of the bronchus intermedius and occlusion of the right middle lobe bronchus. There is narrowing of the central left upper lobe airways, including occlusion of the apical posterior segmental airway proximally.\par \par Nodular thickening along the left major fissure and mediastinal pleural thickening.\par \par Left lung wedge resection.\par \par Patchy ground-glass opacities at the left base multifocally.\par \par Bilateral upper lobe linear atelectasis/scarring.\par \par 1.4 cm nodular opacity in the right upper lobe lung the major fissure.\par \par Calcified lung granulomas. Few calcified hilar nodes suspected.\par \par No pleural effusion.\par \par There is suggestion of focal stricturing of of the central right lower lobe pulmonary artery. Main pulmonary artery diameter measures 4.1 cm.\par \par Normal caliber thoracic aorta. No large mediastinal nodes. Heart size normal. No pericardial effusion.\par \par Mild bilateral gynecomastia. Hepatic hypodense lesion measuring 1.2 cm, likely cyst. Contrast within the renal collecting system/proximal ureters. Osseous structures unremarkable.\par \par IMPRESSION:.\par \par Patchy ground-glass opacities throughout the left lower lobe multifocally may represent infectious or inflammatory process and recommend CT chest follow-up in 3 months to ensure clearing.\par \par Nodularity along the left major fissure and left mediastinal pleura; this is of unclear etiology, but malignancy/metastasis is a diagnostic consideration.\par \par Indeterminate 1.4 cm nodule along the right major fissure.\par \par Central airway stricturing and occlusion as described above.\par \par Suspected focal narrowing of the central right lower lobe pulmonary artery; vascular structures are not adequately assessed without intravenous contrast.\par \par Recommend correlation with outside CT chest exam if available.\par \par ONESIMO MARIE MD; Attending Radiologist\par This document has been electronically signed. Mar 1 2023 6:38PM\par \par 3/1/2023:\par Summary:\par  1. Left ventricular ejection fraction, by visual estimation, is 65 to 70%.\par  2. Normal global left ventricular systolic function.\par  3. Mildly increased LV wall thickness.\par  4. Spectral Doppler shows pseudonormal pattern of left ventricular myocardial filling (Grade II diastolic dysfunction).\par  5. There is mild septal left ventricular hypertrophy.\par  6. Normal left atrial size.\par  7. Normal right atrial size.\par  8. Mild thickening of the anterior and posterior mitral valve leaflets.\par  9. Mild to moderate mitral valve regurgitation.\par 10. Moderate mitral annular calcification.\par 11. Sclerotic aortic valve with normal opening.\par \par MD Debbi Electronically signed on 3/1/2023 at 3:27:52 PM\par \par 4/25/2023: Pulmonary function test-mild degree of airways obstruction on spirometry.  Lung volumes and diffusion capacity are normal.

## 2023-04-26 NOTE — CONSULT LETTER
[Dear  ___] : Dear  [unfilled], [Consult Letter:] : I had the pleasure of evaluating your patient, [unfilled]. [Please see my note below.] : Please see my note below. [Consult Closing:] : Thank you very much for allowing me to participate in the care of this patient.  If you have any questions, please do not hesitate to contact me. [Sincerely,] : Sincerely, [FreeTextEntry3] : Don Arauz MD FCCP\par Pulmonary/Critical Care/Sleep Medicine\par Department of Internal Medicine\par \par [facility]\par

## 2023-04-26 NOTE — DISCUSSION/SUMMARY
[FreeTextEntry1] : 47-year-old male seen today for the above.  Patient's lung function is relatively normal except for mild degree of airway obstruction.  The lung nodule seen appear to have no level of activity and most likely represent prior inflammation.  In addition the mediastinal and hilar nodes are consistent with old sarcoidosis.  A follow-up CT with contrast will be performed in 3 months time.  If any changes noted we will proceed with biopsy

## 2023-07-05 ENCOUNTER — APPOINTMENT (OUTPATIENT)
Dept: CT IMAGING | Facility: CLINIC | Age: 48
End: 2023-07-05
Payer: COMMERCIAL

## 2023-07-05 PROCEDURE — 71260 CT THORAX DX C+: CPT

## 2023-07-17 ENCOUNTER — NON-APPOINTMENT (OUTPATIENT)
Age: 48
End: 2023-07-17

## 2023-07-18 ENCOUNTER — APPOINTMENT (OUTPATIENT)
Dept: PULMONOLOGY | Facility: CLINIC | Age: 48
End: 2023-07-18
Payer: COMMERCIAL

## 2023-07-18 VITALS — DIASTOLIC BLOOD PRESSURE: 70 MMHG | HEART RATE: 83 BPM | SYSTOLIC BLOOD PRESSURE: 142 MMHG | OXYGEN SATURATION: 98 %

## 2023-07-18 VITALS — HEIGHT: 65 IN | BODY MASS INDEX: 33.15 KG/M2 | WEIGHT: 199 LBS

## 2023-07-18 DIAGNOSIS — R91.8 OTHER NONSPECIFIC ABNORMAL FINDING OF LUNG FIELD: ICD-10-CM

## 2023-07-18 DIAGNOSIS — J98.51 MEDIASTINITIS/: ICD-10-CM

## 2023-07-18 PROCEDURE — 99215 OFFICE O/P EST HI 40 MIN: CPT | Mod: 25

## 2023-07-18 PROCEDURE — 94727 GAS DIL/WSHOT DETER LNG VOL: CPT

## 2023-07-18 PROCEDURE — 85018 HEMOGLOBIN: CPT | Mod: QW

## 2023-07-18 PROCEDURE — 94010 BREATHING CAPACITY TEST: CPT

## 2023-07-18 PROCEDURE — 94729 DIFFUSING CAPACITY: CPT

## 2023-07-18 NOTE — HISTORY OF PRESENT ILLNESS
[Never] : never [TextBox_4] : 3/3/2023:\par 47-year-old male, native [place] seen today after a 3-1/2-year hiatus from this office.  Patient was found in 2019 to have a left hilar mass occluding the pulmonary artery with associated left pleural effusion at [facility].  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  He was evaluated by cardiology at that time with no clear indication for treatment.\par \par Patient is status post discharge from [facility] from 3/1-3/2/2023.  He was sent from his primary care doctor's office to the ER for EKG changes.  His course as described above is also complicated by an empyema in 2019 which required drainage.  He has been continuing to complain of episodes of dyspnea with exertion as well as atypical chest pressure.  Patient was admitted and underwent a left heart catheterization which was reported as unremarkable.  D-dimer was also negative.  CAT scan performed as well as echocardiogram described below.  Patient denies any hemoptysis fevers chills lightheadedness or dizziness.  (Hospitalization reviewed on sunrise)\par \par It was requested that his prior films be compared to his recent images and a PET/CT as well as pulmonary functions were ordered.\par \par 4/25/2023:\par Patient denies any complaints of cough wheeze shortness of breath chest pains palpitations lightheadedness or dizziness.  He did have a CAT scan of the chest which is reviewed below

## 2023-07-18 NOTE — CONSULT LETTER
[Dear  ___] : Dear  [unfilled], [Consult Letter:] : I had the pleasure of evaluating your patient, [unfilled]. [Please see my note below.] : Please see my note below. [Consult Closing:] : Thank you very much for allowing me to participate in the care of this patient.  If you have any questions, please do not hesitate to contact me. [Sincerely,] : Sincerely, [FreeTextEntry3] : Don Arauz MD FCCP\par Pulmonary/Critical Care/Sleep Medicine\par Department of Internal Medicine\par \par [facility]\par

## 2023-07-18 NOTE — END OF VISIT
[Time Spent: ___ minutes] : I have spent [unfilled] minutes of time on the encounter. [FreeTextEntry3] : CT reviewed on PACS,  used

## 2023-07-18 NOTE — PROCEDURE
[FreeTextEntry1] : 8/7/2019: [facility] CT angiography-small partially calcified lymph nodes present in the hilar regions.  These are causing narrowing of the central bronchi as well as narrowing portions of the right main and left main pulmonary arteries.  Left main pulmonary artery measures 4.3 cm.  No flow of IV contrast into the pulmonary arteries within the left lung is noted.  This is likely secondary to narrowing/stenosis of the distal left main pulmonary artery by partially calcified left hilar adenopathy.  No filling defects are noted.  As described also distal portion of the right main proximal right lower lobe pulmonary artery segment is noted.  Multiple calcified granuloma over the right upper lobe.  No acute pulmonary emboli was seen\par \par ACC: [id number] EXAM: CT CHEST ORDERED BY: ABIGAIL MORALES\par \par PROCEDURE DATE: 03/01/2023\par \par \par \par INTERPRETATION: HISTORY: Admitting Dxs: R07.9 CHEST PAIN, UNSPECIFIED. Possible left-sided infiltrate.\par \par EXAMINATION: CT CHEST was performed without IV contrast.\par \par COMPARISON: No prior CT chest comparison available.\par \par FINDINGS:\par \par There is occlusion of the right upper lobe bronchus and narrowing of the bronchus intermedius and occlusion of the right middle lobe bronchus. There is narrowing of the central left upper lobe airways, including occlusion of the apical posterior segmental airway proximally.\par \par Nodular thickening along the left major fissure and mediastinal pleural thickening.\par \par Left lung wedge resection.\par \par Patchy ground-glass opacities at the left base multifocally.\par \par Bilateral upper lobe linear atelectasis/scarring.\par \par 1.4 cm nodular opacity in the right upper lobe lung the major fissure.\par \par Calcified lung granulomas. Few calcified hilar nodes suspected.\par \par No pleural effusion.\par \par There is suggestion of focal stricturing of of the central right lower lobe pulmonary artery. Main pulmonary artery diameter measures 4.1 cm.\par \par Normal caliber thoracic aorta. No large mediastinal nodes. Heart size normal. No pericardial effusion.\par \par Mild bilateral gynecomastia. Hepatic hypodense lesion measuring 1.2 cm, likely cyst. Contrast within the renal collecting system/proximal ureters. Osseous structures unremarkable.\par \par IMPRESSION:.\par \par Patchy ground-glass opacities throughout the left lower lobe multifocally may represent infectious or inflammatory process and recommend CT chest follow-up in 3 months to ensure clearing.\par \par Nodularity along the left major fissure and left mediastinal pleura; this is of unclear etiology, but malignancy/metastasis is a diagnostic consideration.\par \par Indeterminate 1.4 cm nodule along the right major fissure.\par \par Central airway stricturing and occlusion as described above.\par \par Suspected focal narrowing of the central right lower lobe pulmonary artery; vascular structures are not adequately assessed without intravenous contrast.\par \par Recommend correlation with outside CT chest exam if available.\par \par ONESIMO MARIE MD; Attending Radiologist\par This document has been electronically signed. Mar 1 2023 6:38PM\par \par 3/1/2023:\par Summary:\par  1. Left ventricular ejection fraction, by visual estimation, is 65 to 70%.\par  2. Normal global left ventricular systolic function.\par  3. Mildly increased LV wall thickness.\par  4. Spectral Doppler shows pseudonormal pattern of left ventricular myocardial filling (Grade II diastolic dysfunction).\par  5. There is mild septal left ventricular hypertrophy.\par  6. Normal left atrial size.\par  7. Normal right atrial size.\par  8. Mild thickening of the anterior and posterior mitral valve leaflets.\par  9. Mild to moderate mitral valve regurgitation.\par 10. Moderate mitral annular calcification.\par 11. Sclerotic aortic valve with normal opening.\par \par MD Debbi Electronically signed on 3/1/2023 at 3:27:52 PM\par \par 4/25/2023: Pulmonary function test-mild degree of airways obstruction on spirometry.  Lung volumes and diffusion capacity are normal.\par \par 7/18/2023: Pulmonary function test-mild degree of airways obstruction seen on spirometry without significant change.  Lung volumes and diffusion capacity are normal

## 2023-07-18 NOTE — REASON FOR VISIT
[Follow-Up] : a follow-up visit [Abnormal CXR/ Chest CT] : an abnormal CXR/ chest CT [Shortness of Breath] : shortness of breath [[employer] ] : provided by [employer]   [Interpreters_IDNumber] : [id number] [Interpreters_FullName] : Jessica [[id number]] : [place]

## 2023-07-18 NOTE — DISCUSSION/SUMMARY
[FreeTextEntry1] : 47-year-old male seen today for findings on CAT scan.  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  No evidence of active sarcoid.  Possible history of histoplasmosis in his native [place].  Presently pulmonary function tests are normal with only minimal degree of airways obstruction.  Patient remains asymptomatic.  We will continue to follow and further his investigation with IgG subsets, IgE, complement level, histoplasmosis antigen and antibody.  Other considerations include connective tissue disease and patient may require follow-up and evaluation with rheumatology.

## 2023-10-18 ENCOUNTER — APPOINTMENT (OUTPATIENT)
Dept: PULMONOLOGY | Facility: CLINIC | Age: 48
End: 2023-10-18